# Patient Record
Sex: FEMALE | Race: WHITE | NOT HISPANIC OR LATINO | URBAN - METROPOLITAN AREA
[De-identification: names, ages, dates, MRNs, and addresses within clinical notes are randomized per-mention and may not be internally consistent; named-entity substitution may affect disease eponyms.]

---

## 2023-02-22 LAB
ANION GAP IN SER/PLAS: 9 MMOL/L (ref 10–20)
CALCIUM (MG/DL) IN SER/PLAS: 9.5 MG/DL (ref 8.6–10.3)
CARBON DIOXIDE, TOTAL (MMOL/L) IN SER/PLAS: 30 MMOL/L (ref 21–32)
CHLORIDE (MMOL/L) IN SER/PLAS: 105 MMOL/L (ref 98–107)
CREATININE (MG/DL) IN SER/PLAS: 0.58 MG/DL (ref 0.5–1.05)
ERYTHROCYTE DISTRIBUTION WIDTH (RATIO) BY AUTOMATED COUNT: 12.7 % (ref 11.5–14.5)
ERYTHROCYTE MEAN CORPUSCULAR HEMOGLOBIN CONCENTRATION (G/DL) BY AUTOMATED: 31.6 G/DL (ref 32–36)
ERYTHROCYTE MEAN CORPUSCULAR VOLUME (FL) BY AUTOMATED COUNT: 92 FL (ref 80–100)
ERYTHROCYTES (10*6/UL) IN BLOOD BY AUTOMATED COUNT: 4.32 X10E12/L (ref 4–5.2)
GFR FEMALE: >90 ML/MIN/1.73M2
GLUCOSE (MG/DL) IN SER/PLAS: 84 MG/DL (ref 74–99)
HEMATOCRIT (%) IN BLOOD BY AUTOMATED COUNT: 39.6 % (ref 36–46)
HEMOGLOBIN (G/DL) IN BLOOD: 12.5 G/DL (ref 12–16)
LEUKOCYTES (10*3/UL) IN BLOOD BY AUTOMATED COUNT: 4.1 X10E9/L (ref 4.4–11.3)
NRBC (PER 100 WBCS) BY AUTOMATED COUNT: 0 /100 WBC (ref 0–0)
PLATELETS (10*3/UL) IN BLOOD AUTOMATED COUNT: 341 X10E9/L (ref 150–450)
POTASSIUM (MMOL/L) IN SER/PLAS: 4.4 MMOL/L (ref 3.5–5.3)
SODIUM (MMOL/L) IN SER/PLAS: 140 MMOL/L (ref 136–145)
UREA NITROGEN (MG/DL) IN SER/PLAS: 24 MG/DL (ref 6–23)

## 2023-02-23 LAB
APPEARANCE, URINE: CLEAR
BILIRUBIN, URINE: NEGATIVE
BLOOD, URINE: NEGATIVE
COLOR, URINE: NORMAL
GLUCOSE, URINE: NEGATIVE MG/DL
KETONES, URINE: NEGATIVE MG/DL
LEUKOCYTE ESTERASE, URINE: NEGATIVE
NITRITE, URINE: NEGATIVE
PH, URINE: 7 (ref 5–8)
PROTEIN, URINE: NEGATIVE MG/DL
SPECIFIC GRAVITY, URINE: 1.01 (ref 1–1.03)
UROBILINOGEN, URINE: <2 MG/DL (ref 0–1.9)

## 2023-02-24 LAB — URINE CULTURE: NO GROWTH

## 2024-05-22 ENCOUNTER — NURSING HOME VISIT (OUTPATIENT)
Dept: POST ACUTE CARE | Facility: EXTERNAL LOCATION | Age: 69
End: 2024-05-22
Payer: COMMERCIAL

## 2024-05-22 DIAGNOSIS — I61.8 OTHER NONTRAUMATIC INTRACEREBRAL HEMORRHAGE, UNSPECIFIED LATERALITY (MULTI): ICD-10-CM

## 2024-05-22 DIAGNOSIS — G81.11 SPASTIC HEMIPLEGIA OF RIGHT DOMINANT SIDE DUE TO NONCEREBROVASCULAR ETIOLOGY (MULTI): ICD-10-CM

## 2024-05-22 DIAGNOSIS — F20.9 SCHIZOPHRENIA, UNSPECIFIED TYPE (MULTI): ICD-10-CM

## 2024-05-22 DIAGNOSIS — I10 PRIMARY HYPERTENSION: Primary | ICD-10-CM

## 2024-05-22 PROCEDURE — 99305 1ST NF CARE MODERATE MDM 35: CPT | Performed by: STUDENT IN AN ORGANIZED HEALTH CARE EDUCATION/TRAINING PROGRAM

## 2024-05-25 PROBLEM — G81.11: Status: ACTIVE | Noted: 2024-05-25

## 2024-05-25 PROBLEM — I10 PRIMARY HYPERTENSION: Status: ACTIVE | Noted: 2024-05-25

## 2024-05-25 PROBLEM — F20.9 SCHIZOPHRENIA (MULTI): Status: ACTIVE | Noted: 2024-05-25

## 2024-05-25 PROBLEM — I61.8: Status: ACTIVE | Noted: 2024-05-25

## 2024-05-25 ASSESSMENT — ENCOUNTER SYMPTOMS
MUSCULOSKELETAL NEGATIVE: 1
GASTROINTESTINAL NEGATIVE: 1
PSYCHIATRIC NEGATIVE: 1
CARDIOVASCULAR NEGATIVE: 1
RESPIRATORY NEGATIVE: 1
CONSTITUTIONAL NEGATIVE: 1
NEUROLOGICAL NEGATIVE: 1

## 2024-05-25 NOTE — PROGRESS NOTES
Subjective   Patient ID: Cristel Boyd is a 68 y.o. female.    Patient is a 68-year-old female with past medical history of ICH with subsequent hemiplegia, dementia, schizophrenia, bipolar, HTN, HLD, and muscle weakness who recently was admitted to the facility for long-term care.  Was recently admitted for skilled therapy however was subsequently cut by insurance as patient was overall optimized.  She states that overall and is tolerating her medications.  Neurological status has subsequently been stable.  And she states has had no recent falls or issues.  No additional issues at this time.        Review of Systems   Constitutional: Negative.    HENT: Negative.     Respiratory: Negative.     Cardiovascular: Negative.    Gastrointestinal: Negative.    Musculoskeletal: Negative.    Neurological: Negative.    Psychiatric/Behavioral: Negative.         Objective Vitals Reviewed via facility EMR   Physical Exam  Constitutional:       General: She is not in acute distress.     Appearance: She is not ill-appearing.   Eyes:      Pupils: Pupils are equal, round, and reactive to light.   Cardiovascular:      Rate and Rhythm: Normal rate and regular rhythm.      Pulses: Normal pulses.      Heart sounds: No murmur heard.  Pulmonary:      Effort: No respiratory distress.      Breath sounds: No wheezing.   Abdominal:      General: Abdomen is flat. Bowel sounds are normal. There is no distension.   Musculoskeletal:      Right lower leg: No edema.      Left lower leg: No edema.   Skin:     General: Skin is warm and dry.   Neurological:      Mental Status: She is alert. Mental status is at baseline.      Cranial Nerves: No cranial nerve deficit.      Motor: No weakness.   Psychiatric:         Mood and Affect: Mood normal.         Behavior: Behavior normal.         Assessment/Plan   Diagnoses and all orders for this visit:  Primary hypertension  Schizophrenia, unspecified type (Multi)  Spastic hemiplegia of right dominant side due  to noncerebrovascular etiology (Multi)  Other nontraumatic intracerebral hemorrhage, unspecified laterality (Multi)        Patient seen and examined at bedside.  Overall medically stable.  Recent laboratory values reviewed.  Medications reviewed and reconciled.  Appreciate psych recommendations with regards to antipsychotics.  Otherwise, continue supportive care.  No changes to medications at this time.  Staff updated with care plan and were agreeable, they also voiced no issues or concerns currently.    Reviewed and approved by AUSTIN MURRAY on 5/25/24 at 1:22 PM.

## 2024-06-30 ENCOUNTER — APPOINTMENT (OUTPATIENT)
Dept: CARDIOLOGY | Facility: HOSPITAL | Age: 69
End: 2024-06-30
Payer: COMMERCIAL

## 2024-06-30 ENCOUNTER — APPOINTMENT (OUTPATIENT)
Dept: RADIOLOGY | Facility: HOSPITAL | Age: 69
End: 2024-06-30
Payer: COMMERCIAL

## 2024-06-30 ENCOUNTER — HOSPITAL ENCOUNTER (EMERGENCY)
Facility: HOSPITAL | Age: 69
Discharge: HOME | End: 2024-06-30
Attending: EMERGENCY MEDICINE
Payer: COMMERCIAL

## 2024-06-30 VITALS
RESPIRATION RATE: 20 BRPM | TEMPERATURE: 98.1 F | HEIGHT: 65 IN | BODY MASS INDEX: 21.67 KG/M2 | SYSTOLIC BLOOD PRESSURE: 162 MMHG | DIASTOLIC BLOOD PRESSURE: 98 MMHG | WEIGHT: 130.07 LBS | HEART RATE: 84 BPM | OXYGEN SATURATION: 96 %
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
## 2024-06-30 LAB
ALBUMIN SERPL BCP-MCNC: 3.9 G/DL (ref 3.4–5)
ALP SERPL-CCNC: 61 U/L (ref 33–136)
ALT SERPL W P-5'-P-CCNC: 20 U/L (ref 7–45)
ANION GAP SERPL CALC-SCNC: 11 MMOL/L (ref 10–20)
AST SERPL W P-5'-P-CCNC: 28 U/L (ref 9–39)
BASOPHILS # BLD AUTO: 0.03 X10*3/UL (ref 0–0.1)
BASOPHILS NFR BLD AUTO: 0.7 %
BILIRUB SERPL-MCNC: 0.3 MG/DL (ref 0–1.2)
BUN SERPL-MCNC: 21 MG/DL (ref 6–23)
CALCIUM SERPL-MCNC: 9.5 MG/DL (ref 8.6–10.3)
CARDIAC TROPONIN I PNL SERPL HS: 4 NG/L (ref 0–13)
CHLORIDE SERPL-SCNC: 110 MMOL/L (ref 98–107)
CO2 SERPL-SCNC: 23 MMOL/L (ref 21–32)
CREAT SERPL-MCNC: 0.61 MG/DL (ref 0.5–1.05)
EGFRCR SERPLBLD CKD-EPI 2021: >90 ML/MIN/1.73M*2
EOSINOPHIL # BLD AUTO: 0.19 X10*3/UL (ref 0–0.7)
EOSINOPHIL NFR BLD AUTO: 4.5 %
ERYTHROCYTE [DISTWIDTH] IN BLOOD BY AUTOMATED COUNT: 12.1 % (ref 11.5–14.5)
GLUCOSE SERPL-MCNC: 104 MG/DL (ref 74–99)
HCT VFR BLD AUTO: 38.6 % (ref 36–46)
HGB BLD-MCNC: 12.7 G/DL (ref 12–16)
IMM GRANULOCYTES # BLD AUTO: 0.02 X10*3/UL (ref 0–0.7)
IMM GRANULOCYTES NFR BLD AUTO: 0.5 % (ref 0–0.9)
LYMPHOCYTES # BLD AUTO: 1.11 X10*3/UL (ref 1.2–4.8)
LYMPHOCYTES NFR BLD AUTO: 26.1 %
MAGNESIUM SERPL-MCNC: 1.48 MG/DL (ref 1.6–2.4)
MCH RBC QN AUTO: 31 PG (ref 26–34)
MCHC RBC AUTO-ENTMCNC: 32.9 G/DL (ref 32–36)
MCV RBC AUTO: 94 FL (ref 80–100)
MONOCYTES # BLD AUTO: 0.38 X10*3/UL (ref 0.1–1)
MONOCYTES NFR BLD AUTO: 8.9 %
NEUTROPHILS # BLD AUTO: 2.52 X10*3/UL (ref 1.2–7.7)
NEUTROPHILS NFR BLD AUTO: 59.3 %
NRBC BLD-RTO: 0 /100 WBCS (ref 0–0)
PLATELET # BLD AUTO: 183 X10*3/UL (ref 150–450)
POTASSIUM SERPL-SCNC: 4.5 MMOL/L (ref 3.5–5.3)
PROT SERPL-MCNC: 7.1 G/DL (ref 6.4–8.2)
RBC # BLD AUTO: 4.1 X10*6/UL (ref 4–5.2)
RBC MORPH BLD: NORMAL
SODIUM SERPL-SCNC: 139 MMOL/L (ref 136–145)
WBC # BLD AUTO: 4.3 X10*3/UL (ref 4.4–11.3)

## 2024-06-30 PROCEDURE — 80053 COMPREHEN METABOLIC PANEL: CPT

## 2024-06-30 PROCEDURE — 99285 EMERGENCY DEPT VISIT HI MDM: CPT | Mod: 25

## 2024-06-30 PROCEDURE — 2500000005 HC RX 250 GENERAL PHARMACY W/O HCPCS

## 2024-06-30 PROCEDURE — 72125 CT NECK SPINE W/O DYE: CPT

## 2024-06-30 PROCEDURE — 2500000001 HC RX 250 WO HCPCS SELF ADMINISTERED DRUGS (ALT 637 FOR MEDICARE OP)

## 2024-06-30 PROCEDURE — 71045 X-RAY EXAM CHEST 1 VIEW: CPT

## 2024-06-30 PROCEDURE — 84484 ASSAY OF TROPONIN QUANT: CPT

## 2024-06-30 PROCEDURE — 93005 ELECTROCARDIOGRAM TRACING: CPT

## 2024-06-30 PROCEDURE — 83735 ASSAY OF MAGNESIUM: CPT

## 2024-06-30 PROCEDURE — 36415 COLL VENOUS BLD VENIPUNCTURE: CPT

## 2024-06-30 PROCEDURE — 85025 COMPLETE CBC W/AUTO DIFF WBC: CPT

## 2024-06-30 PROCEDURE — 70450 CT HEAD/BRAIN W/O DYE: CPT | Performed by: RADIOLOGY

## 2024-06-30 PROCEDURE — 2500000004 HC RX 250 GENERAL PHARMACY W/ HCPCS (ALT 636 FOR OP/ED)

## 2024-06-30 PROCEDURE — 71045 X-RAY EXAM CHEST 1 VIEW: CPT | Performed by: RADIOLOGY

## 2024-06-30 PROCEDURE — 72125 CT NECK SPINE W/O DYE: CPT | Performed by: RADIOLOGY

## 2024-06-30 PROCEDURE — 70450 CT HEAD/BRAIN W/O DYE: CPT

## 2024-06-30 RX ORDER — LIDOCAINE 560 MG/1
1 PATCH PERCUTANEOUS; TOPICAL; TRANSDERMAL DAILY
Status: DISCONTINUED | OUTPATIENT
Start: 2024-06-30 | End: 2024-06-30 | Stop reason: HOSPADM

## 2024-06-30 RX ORDER — ACETAMINOPHEN 325 MG/1
975 TABLET ORAL ONCE
Status: COMPLETED | OUTPATIENT
Start: 2024-06-30 | End: 2024-06-30

## 2024-06-30 RX ORDER — ARIPIPRAZOLE 10 MG/1
10 TABLET, ORALLY DISINTEGRATING ORAL DAILY
Status: DISCONTINUED | OUTPATIENT
Start: 2024-06-30 | End: 2024-06-30

## 2024-06-30 RX ORDER — ARIPIPRAZOLE 10 MG/1
10 TABLET ORAL DAILY
Status: DISCONTINUED | OUTPATIENT
Start: 2024-06-30 | End: 2024-06-30 | Stop reason: HOSPADM

## 2024-06-30 RX ORDER — LANOLIN ALCOHOL/MO/W.PET/CERES
400 CREAM (GRAM) TOPICAL DAILY
Status: DISCONTINUED | OUTPATIENT
Start: 2024-06-30 | End: 2024-06-30 | Stop reason: HOSPADM

## 2024-06-30 ASSESSMENT — LIFESTYLE VARIABLES
HAVE YOU EVER FELT YOU SHOULD CUT DOWN ON YOUR DRINKING: NO
EVER HAD A DRINK FIRST THING IN THE MORNING TO STEADY YOUR NERVES TO GET RID OF A HANGOVER: NO
TOTAL SCORE: 0
EVER FELT BAD OR GUILTY ABOUT YOUR DRINKING: NO
HAVE PEOPLE ANNOYED YOU BY CRITICIZING YOUR DRINKING: NO

## 2024-06-30 ASSESSMENT — PAIN - FUNCTIONAL ASSESSMENT
PAIN_FUNCTIONAL_ASSESSMENT: PAINAD (PAIN ASSESSMENT IN ADVANCED DEMENTIA SCALE)
PAIN_FUNCTIONAL_ASSESSMENT: 0-10

## 2024-06-30 ASSESSMENT — PAIN DESCRIPTION - LOCATION
LOCATION: SHOULDER

## 2024-06-30 ASSESSMENT — PAIN SCALES - WONG BAKER: WONGBAKER_NUMERICALRESPONSE: HURTS WHOLE LOT

## 2024-06-30 ASSESSMENT — PAIN SCALES - PAIN ASSESSMENT IN ADVANCED DEMENTIA (PAINAD)
CONSOLABILITY: NO NEED TO CONSOLE
BREATHING: NORMAL
BODYLANGUAGE: RELAXED

## 2024-06-30 ASSESSMENT — PAIN DESCRIPTION - DESCRIPTORS: DESCRIPTORS: SHARP

## 2024-06-30 ASSESSMENT — PAIN DESCRIPTION - FREQUENCY: FREQUENCY: CONSTANT/CONTINUOUS

## 2024-06-30 ASSESSMENT — PAIN DESCRIPTION - ORIENTATION
ORIENTATION: RIGHT

## 2024-06-30 ASSESSMENT — PAIN DESCRIPTION - PAIN TYPE
TYPE: CHRONIC PAIN
TYPE: CHRONIC PAIN

## 2024-06-30 ASSESSMENT — PAIN DESCRIPTION - PROGRESSION: CLINICAL_PROGRESSION: NOT CHANGED

## 2024-06-30 ASSESSMENT — PAIN SCALES - GENERAL: PAINLEVEL_OUTOF10: 7

## 2024-06-30 NOTE — DISCHARGE INSTRUCTIONS
No injuries found on workup today.  No acute intracranial bleed or other injuries noted on CT of the head.  Labs are otherwise unremarkable.    Seek immediate medical attention if you develop: worsening headache, nausea, vomiting, confusion, weakness, loss of motion in your arms or legs, loss of control of your urine or stool, difficulty waking from sleep, neck pain, fever, or any new or worsening symptoms.

## 2024-06-30 NOTE — ED PROVIDER NOTES
EMERGENCY DEPARTMENT ENCOUNTER      Pt Name: Cristel Boyd  MRN: [id number]  Birthdate 1955  Date of evaluation: 6/30/2024  Provider: Sandeep Wood DO    CHIEF COMPLAINT       Chief Complaint   Patient presents with    Fall     Unwitnessed fall around 0830, staff heard fall. No obvious injuries.         HISTORY OF PRESENT ILLNESS    Patient is a 68-year-old female coming from [facility] after she had a fall this morning.  This was unwitnessed.  This happened around 2 hours prior to arrival, patient was advised to come in for further evaluation by the patient's per physician.  Patient is a history of bipolar disorder, hypertension, hemiplegia and hemiparesis following a nontraumatic intracerebral hemorrhage affecting the right side, hyperlipidemia.  Patient is otherwise unable to provide any significant history.      History provided by:  EMS personnel   used: No        Nursing Notes were reviewed.    PAST MEDICAL HISTORY   No past medical history on file.      SURGICAL HISTORY     No past surgical history on file.      CURRENT MEDICATIONS       There are no discharge medications for this patient.      ALLERGIES     Patient has no known allergies.    FAMILY HISTORY     No family history on file.       SOCIAL HISTORY       Social History     Socioeconomic History    Marital status:      Spouse name: Not on file    Number of children: Not on file    Years of education: Not on file    Highest education level: Not on file   Occupational History    Not on file   Tobacco Use    Smoking status: Not on file    Smokeless tobacco: Not on file   Substance and Sexual Activity    Alcohol use: Not on file    Drug use: Not on file    Sexual activity: Not on file   Other Topics Concern    Not on file   Social History Narrative    Not on file     Social Determinants of Health     Financial Resource Strain: Not on file   Food Insecurity: Not on file   Transportation Needs:  Not on file   Physical Activity: Not on file   Stress: Not on file   Social Connections: Not on file   Intimate Partner Violence: Not on file   Housing Stability: Not on file       SCREENINGS                        PHYSICAL EXAM    (up to 7 for level 4, 8 or more for level 5)     ED Triage Vitals   Temp Pulse Resp BP   -- -- -- --      SpO2 Temp src Heart Rate Source Patient Position   -- -- -- --      BP Location FiO2 (%)     -- --       Physical Exam  Vitals and nursing note reviewed.   Constitutional:       Appearance: She is not toxic-appearing.   HENT:      Head: Normocephalic and atraumatic.      Right Ear: External ear normal.      Left Ear: External ear normal.      Nose: Nose normal.   Eyes:      General:         Right eye: No discharge.         Left eye: No discharge.      Extraocular Movements: Extraocular movements intact.      Conjunctiva/sclera: Conjunctivae normal.      Pupils: Pupils are equal, round, and reactive to light.   Cardiovascular:      Rate and Rhythm: Normal rate and regular rhythm.      Pulses: Normal pulses.      Heart sounds: Normal heart sounds.   Pulmonary:      Effort: Pulmonary effort is normal.      Breath sounds: Normal breath sounds.   Abdominal:      General: There is no distension.      Palpations: Abdomen is soft. There is no mass.      Tenderness: There is no abdominal tenderness. There is no guarding.   Musculoskeletal:         General: No deformity or signs of injury.      Comments: No obvious injuries on exam.   Skin:     General: Skin is warm and dry.   Neurological:      General: No focal deficit present.      Mental Status: She is oriented to person, place, and time. Mental status is at baseline.      GCS: GCS eye subscore is 3. GCS verbal subscore is 5. GCS motor subscore is 6.   Psychiatric:         Mood and Affect: Mood normal.         Behavior: Behavior normal.          DIAGNOSTIC RESULTS     LABS:  Labs Reviewed   CBC WITH AUTO DIFFERENTIAL - Abnormal       Result  Value    WBC 4.3 (*)     nRBC 0.0      RBC 4.10      Hemoglobin 12.7      Hematocrit 38.6      MCV 94      MCH 31.0      MCHC 32.9      RDW 12.1      Platelets 183      Neutrophils % 59.3      Immature Granulocytes %, Automated 0.5      Lymphocytes % 26.1      Monocytes % 8.9      Eosinophils % 4.5      Basophils % 0.7      Neutrophils Absolute 2.52      Immature Granulocytes Absolute, Automated 0.02      Lymphocytes Absolute 1.11 (*)     Monocytes Absolute 0.38      Eosinophils Absolute 0.19      Basophils Absolute 0.03     COMPREHENSIVE METABOLIC PANEL - Abnormal    Glucose 104 (*)     Sodium 139      Potassium 4.5      Chloride 110 (*)     Bicarbonate 23      Anion Gap 11      Urea Nitrogen 21      Creatinine 0.61      eGFR >90      Calcium 9.5      Albumin 3.9      Alkaline Phosphatase 61      Total Protein 7.1      AST 28      Bilirubin, Total 0.3      ALT 20     MAGNESIUM - Abnormal    Magnesium 1.48 (*)    SERIAL TROPONIN-INITIAL - Normal    Troponin I, High Sensitivity 4      Narrative:     Less than 99th percentile of normal range cutoff-  Female and children under 18 years old <14 ng/L; Male <21 ng/L: Negative  Repeat testing should be performed if clinically indicated.     Female and children under 18 years old 14-50 ng/L; Male 21-50 ng/L:  Consistent with possible cardiac damage and possible increased clinical   risk. Serial measurements may help to assess extent of myocardial damage.     >50 ng/L: Consistent with cardiac damage, increased clinical risk and  myocardial infarction. Serial measurements may help assess extent of   myocardial damage.      NOTE: Children less than 1 year old may have higher baseline troponin   levels and results should be interpreted in conjunction with the overall   clinical context.     NOTE: Troponin I testing is performed using a different   testing methodology at [facility] than at other   [facility] hospitals. Direct result comparisons should only   be made  within the same method.   TROPONIN SERIES- (INITIAL, 1 HR)    Narrative:     The following orders were created for panel order Troponin I Series, High Sensitivity (0, 1 HR).  Procedure                               Abnormality         Status                     ---------                               -----------         ------                     Troponin I, High Sensiti...[[id number]]  Normal              Final result               Troponin, High Sensitivi...[[id number]]                                                   Please view results for these tests on the individual orders.   SERIAL TROPONIN, 1 HOUR   MORPHOLOGY    RBC Morphology No significant RBC morphology present         All other labs were within normal range or not returned as of this dictation.    Imaging  CT head wo IV contrast   Final Result   Mild age-related changes. No acute intracranial process.        Mild peripheral mucosal thickening of the paranasal sinuses.        Partial opacification left mastoid air cells posteriorly compatible   with age-indeterminate mild mastoiditis.        MACRO:   None.        Signed by: Krunal Angela 6/30/2024 12:09 PM   Dictation workstation:   [id number]      CT cervical spine wo IV contrast   Final Result   Prominent multilevel degenerative disc and facet changes throughout   the entire cervical spine as well as the upper thoracic spine.        Postoperative changes with metallic posterior elements struts from C3   through C7.        Mild anterolisthesis of C7 relative to T1 which is likely   chronic/degenerative.        No acute fracture or compression deformity.        MACRO:   None.        Signed by: Krunal Angela 6/30/2024 12:13 PM   Dictation workstation:   [id number]      XR chest 1 view   Final Result   No focal infiltrate or pneumothorax is identified.        MACRO:   None.        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  Patient had an unwitnessed fall around 2 hours ago.  Otherwise history is unable to be obtained from the patient.  Patient has GCS 14, losing a point for eyes but she does open eyes to voice.  Otherwise there are no significant injuries noted on exam.  Head is atraumatic.  Will obtain CT head and C-spine to evaluate for acute injuries.  Also obtain cardiac workup given that the patient's fall was unwitnessed.  If workup is unremarkable anticipate discharge back to her nursing facility.        Please see ED course for additional MDM.    ED Course as of 07/01/24 2339   Sun Jun 30, 2024   1215 CT of the head demonstrates no acute intracranial process.  Other chronic findings noted.  CT of the C-spine negative for any acute fractures or compression deformities. [CL]   1215 Chest x-ray unremarkable. [CL]   1258 Home Abilify dose ordered. [CL]   1300 Troponin normal. [CL]   1300 Magnesium replacement was ordered for mildly low magnesium level 1.48. [CL]   1313 CBC is at patient's baseline.  Lab work is otherwise reassuring.  Patient be discharged back to her nursing facility. [CL]      ED Course User Index  [CL] Delonte Levy,          Diagnoses as of 07/01/24 2339   Fall, initial encounter        Patient and or family in agreement and understanding of treatment plan.  All questions answered.      I reviewed the case with the attending ED physician. The attending ED physician agrees with the plan. Patient and/or patient´s representative was counseled regarding labs, imaging, likely diagnosis, and plan. All questions were answered.    ED Medications administered this visit:    Medications   acetaminophen (Tylenol) tablet 975 mg (975 mg oral Given 6/30/24 1342)       New Prescriptions from this visit:    There are no discharge  medications for this patient.      Follow-up:  Parmjit Melgar, DO  [address] [facility] [address]  [place] [id number] [phone]    Schedule an appointment as soon as possible for a visit           Final Impression:   1. Fall, initial encounter          (Please note that portions of this note were completed with a voice recognition program.  Efforts were made to edit the dictations but occasionally words are mis-transcribed.)     Delonte Levy DO  Resident  06/30/24 1314      The patient was seen by the resident/fellow.  I have personally performed a substantive portion of the encounter.  I have seen and examined the patient; agree with the workup, evaluation, MDM, management and diagnosis.  The care plan has been discussed with the resident; I have reviewed the resident’s note and agree with the documented findings.       Sandeep Wood DO  07/01/24 [id number]

## 2024-07-01 LAB
ATRIAL RATE: 51 BPM
P AXIS: 77 DEGREES
P OFFSET: 171 MS
P ONSET: 120 MS
PR INTERVAL: 168 MS
Q ONSET: 204 MS
QRS COUNT: 8 BEATS
QRS DURATION: 134 MS
QT INTERVAL: 430 MS
QTC CALCULATION(BAZETT): 396 MS
QTC FREDERICIA: 407 MS
R AXIS: -70 DEGREES
T AXIS: 68 DEGREES
T OFFSET: 419 MS
VENTRICULAR RATE: 51 BPM

## 2024-07-10 ENCOUNTER — NURSING HOME VISIT (OUTPATIENT)
Dept: POST ACUTE CARE | Facility: EXTERNAL LOCATION | Age: 69
End: 2024-07-10
Payer: COMMERCIAL

## 2024-07-10 DIAGNOSIS — F20.9 SCHIZOPHRENIA, UNSPECIFIED TYPE (MULTI): ICD-10-CM

## 2024-07-10 DIAGNOSIS — I95.89 HYPOTENSION DUE TO HYPOVOLEMIA: Primary | ICD-10-CM

## 2024-07-10 DIAGNOSIS — G81.11 SPASTIC HEMIPLEGIA OF RIGHT DOMINANT SIDE DUE TO NONCEREBROVASCULAR ETIOLOGY (MULTI): ICD-10-CM

## 2024-07-10 DIAGNOSIS — I61.8 OTHER NONTRAUMATIC INTRACEREBRAL HEMORRHAGE, UNSPECIFIED LATERALITY (MULTI): ICD-10-CM

## 2024-07-10 DIAGNOSIS — E86.1 HYPOTENSION DUE TO HYPOVOLEMIA: Primary | ICD-10-CM

## 2024-07-13 PROBLEM — E86.1 HYPOTENSION DUE TO HYPOVOLEMIA: Status: ACTIVE | Noted: 2024-07-13

## 2024-07-13 PROBLEM — I95.89 HYPOTENSION DUE TO HYPOVOLEMIA: Status: ACTIVE | Noted: 2024-07-13

## 2024-07-13 ASSESSMENT — ENCOUNTER SYMPTOMS
CARDIOVASCULAR NEGATIVE: 1
PSYCHIATRIC NEGATIVE: 1
FATIGUE: 1
WEAKNESS: 1
ARTHRALGIAS: 1
RESPIRATORY NEGATIVE: 1
GASTROINTESTINAL NEGATIVE: 1

## 2024-07-14 NOTE — PROGRESS NOTES
Subjective   Patient ID: Cristel Boyd is a 68 y.o. female.    Patient seen and examined on routine evaluation.  On evaluation, patient is a poor historian and is unable to provide much history.  However, she endorses that she feels slightly weaker than usual.  In addition, review of vitals shows that patient was somewhat hypotensive.  She does not endorse any constitutional symptoms, however does feel lightheaded.  Endorses no changes in medications and states no issues or difficulty.  Otherwise, regards no additional issues or difficulties.        Review of Systems   Constitutional:  Positive for fatigue.   HENT: Negative.     Respiratory: Negative.     Cardiovascular: Negative.    Gastrointestinal: Negative.    Musculoskeletal:  Positive for arthralgias.   Neurological:  Positive for weakness.   Psychiatric/Behavioral: Negative.         Objective Vitals Reviewed via facility EMR   Physical Exam  Constitutional:       General: She is not in acute distress.     Appearance: She is ill-appearing.      Comments: pale   Eyes:      Pupils: Pupils are equal, round, and reactive to light.   Cardiovascular:      Rate and Rhythm: Normal rate and regular rhythm.      Pulses: Normal pulses.      Heart sounds: No murmur heard.  Pulmonary:      Effort: No respiratory distress.      Breath sounds: No wheezing.   Abdominal:      General: Abdomen is flat. Bowel sounds are normal. There is no distension.   Musculoskeletal:      Right lower leg: No edema.      Left lower leg: No edema.   Skin:     General: Skin is warm and dry.   Neurological:      Mental Status: She is alert. Mental status is at baseline.      Cranial Nerves: No cranial nerve deficit.      Motor: Weakness present.   Psychiatric:         Mood and Affect: Mood normal.         Behavior: Behavior normal.         Assessment/Plan   Diagnoses and all orders for this visit:  Hypotension due to hypovolemia  Schizophrenia, unspecified type (Multi)  Spastic hemiplegia of  right dominant side due to noncerebrovascular etiology (Multi)  Other nontraumatic intracerebral hemorrhage, unspecified laterality (Multi)      Patient seen and examined.  Overall looks unwell on evaluation.  Unable to provide much history.  Noted to be significantly hypotensive suspect that this is secondary to her blood pressure medications however concern for metabolic or infectious etiology at this time.  Due to this, will obtain stat labs CBC CMP as well as magnesium and.  We will also discontinue antihypertensives until workup is complete.  Advised staff to send patient to the hospital if blood pressure does not improve or she is more is more symptomatic.  They are agreeable to plan.  Discussed with ANGELO in order to facilitate close follow-up.  Otherwise, no additional issues at this time.    Reviewed and approved by AUSTIN MURRAY on 7/13/24 at 10:43 PM.

## 2024-07-24 ENCOUNTER — NURSING HOME VISIT (OUTPATIENT)
Dept: POST ACUTE CARE | Facility: EXTERNAL LOCATION | Age: 69
End: 2024-07-24
Payer: COMMERCIAL

## 2024-07-24 DIAGNOSIS — M25.511 ACUTE PAIN OF RIGHT SHOULDER: ICD-10-CM

## 2024-07-24 DIAGNOSIS — G81.11 SPASTIC HEMIPLEGIA OF RIGHT DOMINANT SIDE DUE TO NONCEREBROVASCULAR ETIOLOGY (MULTI): ICD-10-CM

## 2024-07-24 DIAGNOSIS — F20.9 SCHIZOPHRENIA, UNSPECIFIED TYPE (MULTI): ICD-10-CM

## 2024-07-24 DIAGNOSIS — M25.531 RIGHT WRIST PAIN: ICD-10-CM

## 2024-07-24 DIAGNOSIS — I10 PRIMARY HYPERTENSION: Primary | ICD-10-CM

## 2024-07-24 PROCEDURE — 99309 SBSQ NF CARE MODERATE MDM 30: CPT | Performed by: STUDENT IN AN ORGANIZED HEALTH CARE EDUCATION/TRAINING PROGRAM

## 2024-07-24 NOTE — LETTER
Patient: Cristel Boyd  : 1955    Encounter Date: 2024    Subjective  Patient ID: Cristel Boyd is a 68 y.o. female.    Patient seen and examined at bedside.  States that she is overall doing well however having persistent issues with shoulder pain.  This recently started over the past couple days.  Was given a lidocaine patch however states that it is not helping and she does have difficulty with range of motion.  No recent trauma noted, no falls noted.  Otherwise, states no additional issues or concerns and overall feels well.        Review of Systems   Constitutional: Negative.    HENT: Negative.     Respiratory: Negative.     Cardiovascular: Negative.    Gastrointestinal: Negative.    Musculoskeletal:  Positive for arthralgias and joint swelling.        Pain in R shoulder and wrist   Psychiatric/Behavioral: Negative.         ObjectiveVitals Reviewed via facility EMR   Physical Exam  Constitutional:       General: She is not in acute distress.     Appearance: She is not ill-appearing.   Eyes:      Pupils: Pupils are equal, round, and reactive to light.   Cardiovascular:      Rate and Rhythm: Normal rate and regular rhythm.      Pulses: Normal pulses.      Heart sounds: No murmur heard.  Pulmonary:      Effort: No respiratory distress.      Breath sounds: No wheezing.   Abdominal:      General: Abdomen is flat. Bowel sounds are normal. There is no distension.   Musculoskeletal:      Right lower leg: No edema.      Left lower leg: No edema.      Comments: Pain in R shoulder, dec rom, pain in wrist on palptiation   Skin:     General: Skin is warm and dry.   Neurological:      Mental Status: She is alert. Mental status is at baseline.      Cranial Nerves: No cranial nerve deficit.      Motor: No weakness.   Psychiatric:         Mood and Affect: Mood normal.         Behavior: Behavior normal.         Assessment/Plan  Diagnoses and all orders for this visit:  Primary hypertension  Schizophrenia,  unspecified type (Multi)  Spastic hemiplegia of right dominant side due to noncerebrovascular etiology (Multi)  Acute pain of right shoulder  Right wrist pain  Patient seen and examined.  Does have significant issues with range of motion of her right breast as well as right shoulder.  Concern for underlying musculoskeletal issue, recommend x-rays of her shoulder as well as wrist today.  Will closely follow-up on results.  Continue supportive care.    Reviewed and approved by PARMJIT MURRAY on 7/25/24 at 10:43 PM.         Electronically Signed By: Parmjit Murray DO   7/25/24 10:43 PM

## 2024-07-25 PROBLEM — M25.531 RIGHT WRIST PAIN: Status: ACTIVE | Noted: 2024-07-25

## 2024-07-25 PROBLEM — M25.511 ACUTE PAIN OF RIGHT SHOULDER: Status: ACTIVE | Noted: 2024-07-25

## 2024-07-25 ASSESSMENT — ENCOUNTER SYMPTOMS
GASTROINTESTINAL NEGATIVE: 1
CONSTITUTIONAL NEGATIVE: 1
CARDIOVASCULAR NEGATIVE: 1
ARTHRALGIAS: 1
PSYCHIATRIC NEGATIVE: 1
RESPIRATORY NEGATIVE: 1
JOINT SWELLING: 1

## 2024-07-26 NOTE — PROGRESS NOTES
Subjective   Patient ID: Cristel Boyd is a 68 y.o. female.    Patient seen and examined at bedside.  States that she is overall doing well however having persistent issues with shoulder pain.  This recently started over the past couple days.  Was given a lidocaine patch however states that it is not helping and she does have difficulty with range of motion.  No recent trauma noted, no falls noted.  Otherwise, states no additional issues or concerns and overall feels well.        Review of Systems   Constitutional: Negative.    HENT: Negative.     Respiratory: Negative.     Cardiovascular: Negative.    Gastrointestinal: Negative.    Musculoskeletal:  Positive for arthralgias and joint swelling.        Pain in R shoulder and wrist   Psychiatric/Behavioral: Negative.         Objective Vitals Reviewed via facility EMR   Physical Exam  Constitutional:       General: She is not in acute distress.     Appearance: She is not ill-appearing.   Eyes:      Pupils: Pupils are equal, round, and reactive to light.   Cardiovascular:      Rate and Rhythm: Normal rate and regular rhythm.      Pulses: Normal pulses.      Heart sounds: No murmur heard.  Pulmonary:      Effort: No respiratory distress.      Breath sounds: No wheezing.   Abdominal:      General: Abdomen is flat. Bowel sounds are normal. There is no distension.   Musculoskeletal:      Right lower leg: No edema.      Left lower leg: No edema.      Comments: Pain in R shoulder, dec rom, pain in wrist on palptiation   Skin:     General: Skin is warm and dry.   Neurological:      Mental Status: She is alert. Mental status is at baseline.      Cranial Nerves: No cranial nerve deficit.      Motor: No weakness.   Psychiatric:         Mood and Affect: Mood normal.         Behavior: Behavior normal.         Assessment/Plan   Diagnoses and all orders for this visit:  Primary hypertension  Schizophrenia, unspecified type (Multi)  Spastic hemiplegia of right dominant side due to  noncerebrovascular etiology (Multi)  Acute pain of right shoulder  Right wrist pain  Patient seen and examined.  Does have significant issues with range of motion of her right breast as well as right shoulder.  Concern for underlying musculoskeletal issue, recommend x-rays of her shoulder as well as wrist today.  Will closely follow-up on results.  Continue supportive care.    Reviewed and approved by AUSTIN MURRAY on 7/25/24 at 10:43 PM.

## 2024-10-02 ENCOUNTER — NURSING HOME VISIT (OUTPATIENT)
Dept: POST ACUTE CARE | Facility: EXTERNAL LOCATION | Age: 69
End: 2024-10-02
Payer: COMMERCIAL

## 2024-10-02 DIAGNOSIS — I10 PRIMARY HYPERTENSION: Primary | ICD-10-CM

## 2024-10-02 DIAGNOSIS — G81.11 SPASTIC HEMIPLEGIA OF RIGHT DOMINANT SIDE DUE TO NONCEREBROVASCULAR ETIOLOGY (MULTI): ICD-10-CM

## 2024-10-02 DIAGNOSIS — F20.89 OTHER SCHIZOPHRENIA: ICD-10-CM

## 2024-10-04 ASSESSMENT — ENCOUNTER SYMPTOMS
CARDIOVASCULAR NEGATIVE: 1
PSYCHIATRIC NEGATIVE: 1
WEAKNESS: 1
RESPIRATORY NEGATIVE: 1
FATIGUE: 1
MUSCULOSKELETAL NEGATIVE: 1
GASTROINTESTINAL NEGATIVE: 1

## 2024-10-05 NOTE — PROGRESS NOTES
Subjective   Patient ID: Cristel Boyd is a 68 y.o. female.    Patient seen and examined at bedside.  She regards that she is overall doing well without any concerns, would like to be discharged from the facility if possible.  Otherwise, endorses some generalized shoulder pain however overall feels stable.  Otherwise states no acute issues or concerns.  Regards that she overall feels well.        Review of Systems   Constitutional:  Positive for fatigue.   HENT: Negative.     Respiratory: Negative.     Cardiovascular: Negative.    Gastrointestinal: Negative.    Musculoskeletal: Negative.    Neurological:  Positive for weakness.   Psychiatric/Behavioral: Negative.         Objective Vitals Reviewed via facility EMR   Physical Exam  Constitutional:       General: She is not in acute distress.     Appearance: She is not ill-appearing.   Eyes:      Pupils: Pupils are equal, round, and reactive to light.   Cardiovascular:      Rate and Rhythm: Normal rate and regular rhythm.      Pulses: Normal pulses.      Heart sounds: No murmur heard.  Pulmonary:      Effort: No respiratory distress.      Breath sounds: No wheezing.   Abdominal:      General: Abdomen is flat. Bowel sounds are normal. There is no distension.   Musculoskeletal:      Right lower leg: No edema.      Left lower leg: No edema.   Skin:     General: Skin is warm and dry.   Neurological:      Mental Status: She is alert. Mental status is at baseline.      Cranial Nerves: No cranial nerve deficit.      Motor: Weakness present.   Psychiatric:         Mood and Affect: Mood normal.         Behavior: Behavior normal.         Assessment/Plan   Diagnoses and all orders for this visit:  Primary hypertension  Other schizophrenia  Spastic hemiplegia of right dominant side due to noncerebrovascular etiology (Multi)      Patient seen and examined at bedside.  Overall medically stable, continue supportive care.  No changes in medications at this time.  Encouraged fall  precautions.  If needed, recommend PT OT, do not recommend that the patient be discharged at this time, as I would be concerned about her overall wellbeing and medical care moving forward.  Otherwise no additional issues.    Reviewed and approved by AUSTIN MURRAY on 10/4/24 at 8:47 PM.

## 2024-11-06 ENCOUNTER — NURSING HOME VISIT (OUTPATIENT)
Dept: POST ACUTE CARE | Facility: EXTERNAL LOCATION | Age: 69
End: 2024-11-06
Payer: COMMERCIAL

## 2024-11-06 DIAGNOSIS — I61.8 OTHER NONTRAUMATIC INTRACEREBRAL HEMORRHAGE, UNSPECIFIED LATERALITY (MULTI): ICD-10-CM

## 2024-11-06 DIAGNOSIS — F20.89 OTHER SCHIZOPHRENIA: ICD-10-CM

## 2024-11-06 DIAGNOSIS — G81.11 SPASTIC HEMIPLEGIA OF RIGHT DOMINANT SIDE DUE TO NONCEREBROVASCULAR ETIOLOGY (MULTI): ICD-10-CM

## 2024-11-06 DIAGNOSIS — I10 PRIMARY HYPERTENSION: Primary | ICD-10-CM

## 2024-11-06 PROCEDURE — 99308 SBSQ NF CARE LOW MDM 20: CPT | Performed by: STUDENT IN AN ORGANIZED HEALTH CARE EDUCATION/TRAINING PROGRAM

## 2024-11-06 ASSESSMENT — ENCOUNTER SYMPTOMS
FATIGUE: 1
GASTROINTESTINAL NEGATIVE: 1
RESPIRATORY NEGATIVE: 1
PSYCHIATRIC NEGATIVE: 1
MUSCULOSKELETAL NEGATIVE: 1
CARDIOVASCULAR NEGATIVE: 1
WEAKNESS: 1

## 2024-11-07 NOTE — PROGRESS NOTES
Subjective   Patient ID: Cristel Boyd is a 68 y.o. female.    Patient seen on routine evaluation, she endorses no issues or concerns, and is tolerating her medications, staff has regarding no issues with behaviors and mentation has overall been stable and at baseline.  She does endorse some chronic arthralgias however nothing has changed as of late.  Otherwise, no additional issues or concerns at this time.        Review of Systems   Constitutional:  Positive for fatigue.   HENT: Negative.     Respiratory: Negative.     Cardiovascular: Negative.    Gastrointestinal: Negative.    Musculoskeletal: Negative.    Neurological:  Positive for weakness.   Psychiatric/Behavioral: Negative.         Objective Vitals Reviewed via facility EMR   Physical Exam  Constitutional:       General: She is not in acute distress.     Appearance: She is not ill-appearing.      Comments: Pleasant at baseline   Eyes:      Pupils: Pupils are equal, round, and reactive to light.   Cardiovascular:      Rate and Rhythm: Normal rate and regular rhythm.      Pulses: Normal pulses.      Heart sounds: No murmur heard.  Pulmonary:      Effort: No respiratory distress.      Breath sounds: No wheezing.   Abdominal:      General: Abdomen is flat. Bowel sounds are normal. There is no distension.   Musculoskeletal:      Right lower leg: No edema.      Left lower leg: No edema.   Skin:     General: Skin is warm and dry.   Neurological:      Mental Status: She is alert. Mental status is at baseline.      Cranial Nerves: No cranial nerve deficit.      Motor: Weakness present.   Psychiatric:         Mood and Affect: Mood normal.         Behavior: Behavior normal.         Assessment/Plan   Diagnoses and all orders for this visit:  Primary hypertension  Other schizophrenia  Other nontraumatic intracerebral hemorrhage, unspecified laterality (Multi)  Spastic hemiplegia of right dominant side due to noncerebrovascular etiology (Multi)        Patient seen and  examined at bedside.  Overall medically stable, does have underlying chronic arthralgias.  Continue fall precautions as well as dementia precautions.  No change in medications at this time discussed care plan with staff.  Noted no issues or concerns.    Reviewed and approved by AUSTIN MURRAY on 11/6/24 at 11:07 PM.

## 2024-12-18 ENCOUNTER — NURSING HOME VISIT (OUTPATIENT)
Dept: POST ACUTE CARE | Facility: EXTERNAL LOCATION | Age: 69
End: 2024-12-18
Payer: COMMERCIAL

## 2024-12-18 DIAGNOSIS — G89.29 OTHER CHRONIC PAIN: ICD-10-CM

## 2024-12-18 DIAGNOSIS — I10 PRIMARY HYPERTENSION: Primary | ICD-10-CM

## 2024-12-18 DIAGNOSIS — F20.89 OTHER SCHIZOPHRENIA: ICD-10-CM

## 2024-12-18 DIAGNOSIS — G81.11 SPASTIC HEMIPLEGIA OF RIGHT DOMINANT SIDE DUE TO NONCEREBROVASCULAR ETIOLOGY (MULTI): ICD-10-CM

## 2024-12-18 PROCEDURE — 99305 1ST NF CARE MODERATE MDM 35: CPT | Performed by: STUDENT IN AN ORGANIZED HEALTH CARE EDUCATION/TRAINING PROGRAM

## 2024-12-19 PROBLEM — G89.29 OTHER CHRONIC PAIN: Status: ACTIVE | Noted: 2024-12-19

## 2024-12-19 ASSESSMENT — ENCOUNTER SYMPTOMS
WEAKNESS: 1
CARDIOVASCULAR NEGATIVE: 1
ARTHRALGIAS: 1
GASTROINTESTINAL NEGATIVE: 1
PSYCHIATRIC NEGATIVE: 1
FATIGUE: 1
RESPIRATORY NEGATIVE: 1

## 2024-12-20 NOTE — PROGRESS NOTES
Subjective   Patient ID: Cristel Boyd is a 68 y.o. female.    68-year-old female with past medical history of schizoaffective disorder, hyperlipidemia  Drug-induced Parkinson's, CVA, degenerative disc disease, polysubstance abuse notably fentanyl, who has had multiple hospitalizations for underlying agitation going back to 2008.  Has been to the hospital multiple times, and was subsequently admitted due to underlying issues of agitation and restlessness.  This happened after the patient's daughter, moved back into her home after she was discharged from nursing facility.  Daughter felt like patient behaviors became more erratic, and brought the patient back for further management to the ED.  Patient was subsequently admitted for further management to psychiatric hospital due to significant issues with psychiatric disorders.  Patient was overall medically optimized, and was subsequently discharged to nursing facility in stable condition.  On evaluation, patient endorses no acute complaints she does endorse some significant arthralgias.  Endorses that this is chronic for her however was seen in the hospital for this and was advised nonopioid management.  Otherwise she states no acute issues or concerns.  Feels overall well.          Review of Systems   Constitutional:  Positive for fatigue.   HENT: Negative.     Respiratory: Negative.     Cardiovascular: Negative.    Gastrointestinal: Negative.    Musculoskeletal:  Positive for arthralgias.   Neurological:  Positive for weakness.   Psychiatric/Behavioral: Negative.         Objective Vitals Reviewed via facility EMR   Physical Exam  Constitutional:       General: She is not in acute distress.     Appearance: She is not ill-appearing.   Eyes:      Pupils: Pupils are equal, round, and reactive to light.   Cardiovascular:      Rate and Rhythm: Normal rate and regular rhythm.      Pulses: Normal pulses.      Heart sounds: No murmur heard.  Pulmonary:      Effort: No  respiratory distress.      Breath sounds: No wheezing.   Abdominal:      General: Abdomen is flat. Bowel sounds are normal. There is no distension.   Musculoskeletal:      Right lower leg: No edema.      Left lower leg: No edema.      Comments: Spastic hemiplegia   Skin:     General: Skin is warm and dry.   Neurological:      Mental Status: She is alert. Mental status is at baseline.      Cranial Nerves: No cranial nerve deficit.      Motor: Weakness present.   Psychiatric:         Mood and Affect: Mood normal.         Behavior: Behavior normal.         Assessment/Plan   Diagnoses and all orders for this visit:  Primary hypertension  Other schizophrenia  Spastic hemiplegia of right dominant side due to noncerebrovascular etiology (Multi)  Other chronic pain      Patient seen examined at bedside.  Overall medically stable, hospital course reviewed and medications reviewed and reconciled does have chronic issues with pain.  Her pain is overall out of proportion of exam.  Will hold off on addition of any additional narcotics at this time and will avoid in the future if possible.  Continue on Tylenol and redirection.  Encouraged continuation of physical therapy, no additional issues at this time.  Discussed care plan with staff.    Reviewed and approved by AUSTIN MURRAY on 12/19/24 at 10:51 PM.

## 2024-12-31 ENCOUNTER — NURSING HOME VISIT (OUTPATIENT)
Dept: POST ACUTE CARE | Facility: EXTERNAL LOCATION | Age: 69
End: 2024-12-31
Payer: COMMERCIAL

## 2024-12-31 DIAGNOSIS — G81.11 SPASTIC HEMIPLEGIA OF RIGHT DOMINANT SIDE DUE TO NONCEREBROVASCULAR ETIOLOGY (MULTI): ICD-10-CM

## 2024-12-31 DIAGNOSIS — F20.89 OTHER SCHIZOPHRENIA: ICD-10-CM

## 2024-12-31 DIAGNOSIS — M25.531 RIGHT WRIST PAIN: ICD-10-CM

## 2024-12-31 DIAGNOSIS — M19.90 CHRONIC ARTHRITIS: ICD-10-CM

## 2024-12-31 DIAGNOSIS — I10 PRIMARY HYPERTENSION: Primary | ICD-10-CM

## 2024-12-31 PROCEDURE — 99308 SBSQ NF CARE LOW MDM 20: CPT | Performed by: STUDENT IN AN ORGANIZED HEALTH CARE EDUCATION/TRAINING PROGRAM

## 2024-12-31 ASSESSMENT — ENCOUNTER SYMPTOMS
GASTROINTESTINAL NEGATIVE: 1
PSYCHIATRIC NEGATIVE: 1
CARDIOVASCULAR NEGATIVE: 1
NEUROLOGICAL NEGATIVE: 1
FATIGUE: 1
RESPIRATORY NEGATIVE: 1
ARTHRALGIAS: 1
BACK PAIN: 1

## 2025-01-01 NOTE — PROGRESS NOTES
Subjective   Patient ID: Cristel Boyd is a 69 y.o. female.    Patient seen and examined on routine evaluation.  She regards that she is overall doing well however is having significant shoulder pain.  Per staff as well as previous evaluations, this is fairly chronic and has had multiple x-rays done showing no acute pathology.  Patient is fairly fixated on this, does request evaluation in the emergency room, however discussed with patient that this is a chronic issue and can be evaluated as outpatient and patient is agreeable.  Otherwise regards no additional issues.        Review of Systems   Constitutional:  Positive for fatigue.   HENT: Negative.     Respiratory: Negative.     Cardiovascular: Negative.    Gastrointestinal: Negative.    Musculoskeletal:  Positive for arthralgias and back pain.   Neurological: Negative.    Psychiatric/Behavioral: Negative.         Objective Vitals Reviewed via facility EMR   Physical Exam  Constitutional:       General: She is not in acute distress.     Appearance: She is not ill-appearing.   Eyes:      Pupils: Pupils are equal, round, and reactive to light.   Cardiovascular:      Rate and Rhythm: Normal rate and regular rhythm.      Pulses: Normal pulses.      Heart sounds: No murmur heard.  Pulmonary:      Effort: No respiratory distress.      Breath sounds: No wheezing.   Abdominal:      General: Abdomen is flat. Bowel sounds are normal. There is no distension.   Musculoskeletal:         General: Tenderness present.      Right lower leg: No edema.      Left lower leg: No edema.      Comments: Difficulty with range of motion   Skin:     General: Skin is warm and dry.   Neurological:      Mental Status: She is alert. Mental status is at baseline.      Cranial Nerves: No cranial nerve deficit.      Motor: Weakness present.   Psychiatric:         Mood and Affect: Mood normal.         Behavior: Behavior normal.         Assessment/Plan   Diagnoses and all orders for this  visit:  Primary hypertension  Other schizophrenia  Spastic hemiplegia of right dominant side due to noncerebrovascular etiology (Multi)  Right wrist pain  Chronic arthritis    Patient seen and examined at bedside.  Overall medically stable, suspect underlying arthritis as patient has not had any recent falls, and this is a fairly chronic issue.  Recommend orthopedic follow-up, Medrol Dosepak to help with underlying inflammation.  Discussed care plan with staff and patient who are agreeable.    Reviewed and approved by AUSTIN MURRAY on 12/31/24 at 9:47 PM.

## 2025-01-30 ENCOUNTER — NURSING HOME VISIT (OUTPATIENT)
Dept: POST ACUTE CARE | Facility: EXTERNAL LOCATION | Age: 70
End: 2025-01-30
Payer: COMMERCIAL

## 2025-01-30 DIAGNOSIS — Z00.8 MEDICAL CLEARANCE FOR PSYCHIATRIC ADMISSION: Primary | ICD-10-CM

## 2025-01-30 DIAGNOSIS — G81.11 SPASTIC HEMIPLEGIA OF RIGHT DOMINANT SIDE DUE TO NONCEREBROVASCULAR ETIOLOGY (MULTI): ICD-10-CM

## 2025-01-30 DIAGNOSIS — I10 PRIMARY HYPERTENSION: ICD-10-CM

## 2025-01-30 DIAGNOSIS — G89.29 OTHER CHRONIC PAIN: ICD-10-CM

## 2025-01-30 PROCEDURE — 99309 SBSQ NF CARE MODERATE MDM 30: CPT | Performed by: STUDENT IN AN ORGANIZED HEALTH CARE EDUCATION/TRAINING PROGRAM

## 2025-01-30 ASSESSMENT — ENCOUNTER SYMPTOMS
FATIGUE: 1
RESPIRATORY NEGATIVE: 1
WEAKNESS: 1
ARTHRALGIAS: 1
AGITATION: 1
BACK PAIN: 1
CARDIOVASCULAR NEGATIVE: 1
GASTROINTESTINAL NEGATIVE: 1

## 2025-01-31 NOTE — PROGRESS NOTES
Subjective   Patient ID: Cristel Boyd is a 69 y.o. female.    Patient seen and examined at bedside.  Overall medically stable, however staff has regarding that she has been more withdrawn and less interactive as of late.  On evaluation, patient endorses that she does not feel like she wants to get out of bed, and that she overall has pain throughout her whole body.  Cannot endorse any pain in any specific area however endorses generalized aches and pains.  Per staff, psych has been evaluating the patient, and do recommend being transferred to psych for further evaluation as patient has become more withdrawn and is overall not interactive.  Patient regards no recent issues or current symptoms.  Otherwise states no additional issues or concerns at this time.        Review of Systems   Constitutional:  Positive for fatigue.   HENT: Negative.     Respiratory: Negative.     Cardiovascular: Negative.    Gastrointestinal: Negative.    Musculoskeletal:  Positive for arthralgias and back pain.   Neurological:  Positive for weakness.   Psychiatric/Behavioral:  Positive for agitation and behavioral problems.        Objective Vitals Reviewed via facility EMR   Physical Exam  Constitutional:       General: She is not in acute distress.     Appearance: She is not ill-appearing.   Eyes:      Pupils: Pupils are equal, round, and reactive to light.   Cardiovascular:      Rate and Rhythm: Normal rate and regular rhythm.      Pulses: Normal pulses.      Heart sounds: No murmur heard.  Pulmonary:      Effort: No respiratory distress.      Breath sounds: No wheezing.   Abdominal:      General: Abdomen is flat. Bowel sounds are normal. There is no distension.   Musculoskeletal:         General: Tenderness present. No swelling or deformity.      Right lower leg: No edema.      Left lower leg: No edema.      Comments: Pain out of proportion of exam   Skin:     General: Skin is warm and dry.   Neurological:      Mental Status: She is  alert. Mental status is at baseline.      Cranial Nerves: No cranial nerve deficit.      Motor: Weakness present.   Psychiatric:         Mood and Affect: Mood normal.         Behavior: Behavior normal.         Assessment/Plan   Diagnoses and all orders for this visit:  Medical clearance for psychiatric admission  Primary hypertension  Spastic hemiplegia of right dominant side due to noncerebrovascular etiology (Multi)  Other chronic pain      Patient seen and examined at bedside.  Overall medically stable however likely would need medical clearance and rule out of any metabolic causes prior to discharge to psychiatric facility.  Recommend CBC CMP mag ESR CRP iron TIBC, as well as thyroid studies.  Obtain a UA.  Encourage patient to be out and interactive within the facility.  Could consider SNRI to potentially help with adjunct pain control however defer to psychiatry.  Otherwise no change in medications at this time.  Continue supportive care.    Reviewed and approved by AUSTIN MURRAY on 1/30/25 at 10:17 PM.

## 2025-02-16 ENCOUNTER — APPOINTMENT (OUTPATIENT)
Dept: RADIOLOGY | Facility: HOSPITAL | Age: 70
End: 2025-02-16
Payer: COMMERCIAL

## 2025-02-16 ENCOUNTER — HOSPITAL ENCOUNTER (EMERGENCY)
Facility: HOSPITAL | Age: 70
Discharge: HOME | End: 2025-02-16
Attending: EMERGENCY MEDICINE
Payer: COMMERCIAL

## 2025-02-16 VITALS
TEMPERATURE: 97.5 F | RESPIRATION RATE: 16 BRPM | WEIGHT: 130 LBS | HEIGHT: 64 IN | HEART RATE: 62 BPM | DIASTOLIC BLOOD PRESSURE: 78 MMHG | BODY MASS INDEX: 22.2 KG/M2 | SYSTOLIC BLOOD PRESSURE: 119 MMHG | OXYGEN SATURATION: 100 %
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
## 2025-02-16 PROCEDURE — 73070 X-RAY EXAM OF ELBOW: CPT | Mod: RT

## 2025-02-16 PROCEDURE — 70450 CT HEAD/BRAIN W/O DYE: CPT | Performed by: RADIOLOGY

## 2025-02-16 PROCEDURE — 70450 CT HEAD/BRAIN W/O DYE: CPT

## 2025-02-16 PROCEDURE — 72125 CT NECK SPINE W/O DYE: CPT | Performed by: RADIOLOGY

## 2025-02-16 PROCEDURE — 99284 EMERGENCY DEPT VISIT MOD MDM: CPT | Mod: 25 | Performed by: EMERGENCY MEDICINE

## 2025-02-16 PROCEDURE — 73030 X-RAY EXAM OF SHOULDER: CPT | Mod: RIGHT SIDE | Performed by: RADIOLOGY

## 2025-02-16 PROCEDURE — 73200 CT UPPER EXTREMITY W/O DYE: CPT | Mod: RIGHT SIDE | Performed by: RADIOLOGY

## 2025-02-16 PROCEDURE — 2500000001 HC RX 250 WO HCPCS SELF ADMINISTERED DRUGS (ALT 637 FOR MEDICARE OP)

## 2025-02-16 PROCEDURE — 73030 X-RAY EXAM OF SHOULDER: CPT | Mod: RT

## 2025-02-16 PROCEDURE — 73110 X-RAY EXAM OF WRIST: CPT | Mod: RIGHT SIDE | Performed by: RADIOLOGY

## 2025-02-16 PROCEDURE — 73200 CT UPPER EXTREMITY W/O DYE: CPT | Mod: RT

## 2025-02-16 PROCEDURE — 73110 X-RAY EXAM OF WRIST: CPT | Mod: RT

## 2025-02-16 PROCEDURE — 72125 CT NECK SPINE W/O DYE: CPT

## 2025-02-16 PROCEDURE — 73070 X-RAY EXAM OF ELBOW: CPT | Mod: RIGHT SIDE | Performed by: RADIOLOGY

## 2025-02-16 RX ORDER — ACETAMINOPHEN 325 MG/1
975 TABLET ORAL ONCE
Status: COMPLETED | OUTPATIENT
Start: 2025-02-16 | End: 2025-02-16

## 2025-02-16 RX ADMIN — ACETAMINOPHEN 975 MG: 325 TABLET ORAL at 16:52

## 2025-02-16 ASSESSMENT — LIFESTYLE VARIABLES
HAVE PEOPLE ANNOYED YOU BY CRITICIZING YOUR DRINKING: NO
TOTAL SCORE: 0
EVER HAD A DRINK FIRST THING IN THE MORNING TO STEADY YOUR NERVES TO GET RID OF A HANGOVER: NO
HAVE YOU EVER FELT YOU SHOULD CUT DOWN ON YOUR DRINKING: NO
EVER FELT BAD OR GUILTY ABOUT YOUR DRINKING: NO

## 2025-02-16 ASSESSMENT — PAIN DESCRIPTION - LOCATION: LOCATION: ARM

## 2025-02-16 ASSESSMENT — PAIN - FUNCTIONAL ASSESSMENT
PAIN_FUNCTIONAL_ASSESSMENT: 0-10

## 2025-02-16 ASSESSMENT — PAIN SCALES - GENERAL
PAINLEVEL_OUTOF10: 8
PAINLEVEL_OUTOF10: 0 - NO PAIN
PAINLEVEL_OUTOF10: 3

## 2025-02-16 ASSESSMENT — PAIN DESCRIPTION - PROGRESSION: CLINICAL_PROGRESSION: GRADUALLY IMPROVING

## 2025-02-16 ASSESSMENT — COLUMBIA-SUICIDE SEVERITY RATING SCALE - C-SSRS
1. IN THE PAST MONTH, HAVE YOU WISHED YOU WERE DEAD OR WISHED YOU COULD GO TO SLEEP AND NOT WAKE UP?: NO
2. HAVE YOU ACTUALLY HAD ANY THOUGHTS OF KILLING YOURSELF?: NO
6. HAVE YOU EVER DONE ANYTHING, STARTED TO DO ANYTHING, OR PREPARED TO DO ANYTHING TO END YOUR LIFE?: NO

## 2025-02-16 ASSESSMENT — PAIN DESCRIPTION - PAIN TYPE: TYPE: ACUTE PAIN

## 2025-02-16 ASSESSMENT — PAIN DESCRIPTION - ORIENTATION: ORIENTATION: RIGHT

## 2025-02-16 NOTE — ED PROVIDER NOTES
EMERGENCY DEPARTMENT ENCOUNTER      Pt Name: Cristel Boyd  MRN: [id number]  Birthdate 1955  Date of evaluation: 2/16/2025  Provider: Shimon Root MD    CHIEF COMPLAINT       Chief Complaint   Patient presents with    Fall     Witnessed slide out of bed at facility, no LOC, no blood thinners. Pt c/o right arm pain and neck pain. C-collar placed by EMS in field.          HISTORY OF PRESENT ILLNESS    HPI  Patient 69-year-old female with history of schizophrenia, bipolar disorder, opiate abuse, HTN presenting after a fall.  Patient was in bed when she attempted to get out without the assistance of an aide.  She lost her balance, slipped from a semiseated position, hitting her rear end and striking the right side of her head.  She did not lose consciousness.  Denies headache, visual changes, nausea, vomiting, chest pain, shortness of breath.  She is primarily complaining about pain to the right side of her head, neck, and diffuse pain from the shoulder down to her wrist.    Nursing Notes were reviewed.    PAST MEDICAL HISTORY   History reviewed. No pertinent past medical history.      SURGICAL HISTORY     History reviewed. No pertinent surgical history.      CURRENT MEDICATIONS       Previous Medications    No medications on file       ALLERGIES     Lidocaine    FAMILY HISTORY     No family history on file.       SOCIAL HISTORY       Social History     Socioeconomic History    Marital status:      Social Drivers of Health     Housing Stability: High Risk (12/2/2024)    Received from [facility] Scre[facility]     What is your living situation today?: I have a place to live today, but i am worried about losing it in the future       SCREENINGS                        PHYSICAL EXAM    (up to 7 for level 4, 8 or more for level 5)     ED Triage Vitals [02/16/25 1636]   Temperature Heart Rate Respirations BP   36.4 °C (97.5 °F) 57 16 124/85      Pulse Ox Temp Source Heart Rate Source Patient  Position   100 % Temporal -- --      BP Location FiO2 (%)     -- --       Physical Exam  Constitutional:       General: She is not in acute distress.     Appearance: She is not ill-appearing.   HENT:      Head: Normocephalic and atraumatic.      Nose: Nose normal.      Mouth/Throat:      Mouth: Mucous membranes are moist.      Pharynx: Oropharynx is clear.   Eyes:      Extraocular Movements: Extraocular movements intact.      Conjunctiva/sclera: Conjunctivae normal.   Neck:      Comments: In cervical collar.  Mid cervical spine is tender to palpation.  No palpable step-offs or deformities.  Cardiovascular:      Rate and Rhythm: Normal rate and regular rhythm.      Pulses: Normal pulses.      Heart sounds: Normal heart sounds.   Pulmonary:      Effort: Pulmonary effort is normal. No respiratory distress.      Breath sounds: Normal breath sounds.   Abdominal:      General: There is no distension.      Palpations: Abdomen is soft.      Tenderness: There is no abdominal tenderness.   Musculoskeletal:         General: No deformity.      Right lower leg: No edema.      Left lower leg: No edema.      Comments: Pain to palpation of the right shoulder, elbow, wrist.  Patient is refusing to move all of these joints.  Distal limb is neurovascularly intact.   Skin:     General: Skin is warm and dry.   Neurological:      General: No focal deficit present.          DIAGNOSTIC RESULTS     LABS:  Labs Reviewed - No data to display    All other labs were within normal range or not returned as of this dictation.    Imaging  CT shoulder right wo IV contrast   Final Result   No acute osseous abnormality.        Severe glenohumeral osteoarthrosis.             MACRO:   None        Signed by: Jenna Plummer 2/16/2025 7:44 PM   Dictation workstation:   [id number]      XR shoulder right 2+ views   Final Result   No definite fracture. Chronic degenerative changes of the shoulder   with irregularity of the articular surface limited  evaluation for   subtle nondisplaced fracture. If clinical concern persists, consider   CT.             MACRO:   None        Signed by: Jenna Plummer 2/16/2025 6:18 PM   Dictation workstation:   [id number]      XR wrist right 3+ views   Final Result   No acute osseous abnormality.             MACRO:   None        Signed by: Jenna Plummer 2/16/2025 6:19 PM   Dictation workstation:   [id number]      XR elbow right 1-2 views   Final Result   No fracture.             MACRO:   None        Signed by: Raudel Arguelles 2/16/2025 6:12 PM   Dictation workstation:   [id number]      CT head wo IV contrast   Final Result   No evidence of acute cortical infarct or intracranial hemorrhage.        MACRO:   None        Signed by: Astrid Keyes 2/16/2025 6:04 PM   Dictation workstation:   [id number]      CT cervical spine wo IV contrast   Final Result   1. No evidence for an acute fracture or subluxation of the cervical   spine.        MACRO:   None        Signed by: Astrid Keyes 2/16/2025 6:06 PM   Dictation workstation:   [id number]           Procedures  Procedures     EMERGENCY DEPARTMENT COURSE/MDM:     Diagnoses as of 02/16/25 1951   Fall, initial encounter        Medical Decision Making  History obtained from the patient and EMS.  Records including labs, imaging, notes independently reviewed by me.  Presentation concerning for possible skull, cervical, shoulder, elbow, wrist fracture.  Patient otherwise neurologically intact.  No indication for labs for a mechanical fall at this time.  Patient given Tylenol for pain.  CT head and cervical spine were negative.  Cervical collar cleared clinically and radiographically.  X-rays of the elbow and wrist did not demonstrate any acute abnormalities.  X-ray of the shoulder was equivocal with CT recommended by radiology.  CT scan of the shoulder did not demonstrate any acute osseous abnormality.  Patient was given reassurance and discharged back to her nursing facility in  satisfactory condition.  All questions answered and return precautions discussed.    Patient and or family in agreement and understanding of treatment plan.  All questions answered.      I reviewed the case with the attending ED physician. The attending ED physician agrees with the plan. Patient and/or patient´s representative was counseled regarding labs, imaging, likely diagnosis, and plan. All questions were answered.    ED Medications administered this visit:    Medications   acetaminophen (Tylenol) tablet 975 mg (975 mg oral Given 2/16/25 1652)       New Prescriptions from this visit:    New Prescriptions    No medications on file       Follow-up:  No follow-up provider specified.      Final Impression:   1. Fall, initial encounter          (Please note that portions of this note were completed with a voice recognition program.  Efforts were made to edit the dictations but occasionally words are mis-transcribed.)     Shimon Root MD  Resident  02/16/25 6032

## 2025-02-17 NOTE — DISCHARGE INSTRUCTIONS
Beatrice did not have any fractures or bleeding on her imaging.  If she develops the worst headache of her life, uncontrollable vomiting, or confusion, return her to the ER.

## 2025-02-19 ENCOUNTER — NURSING HOME VISIT (OUTPATIENT)
Dept: POST ACUTE CARE | Facility: EXTERNAL LOCATION | Age: 70
End: 2025-02-19
Payer: COMMERCIAL

## 2025-02-19 DIAGNOSIS — G89.29 OTHER CHRONIC PAIN: ICD-10-CM

## 2025-02-19 DIAGNOSIS — F20.89 OTHER SCHIZOPHRENIA: Primary | ICD-10-CM

## 2025-02-19 DIAGNOSIS — R19.7 DIARRHEA OF PRESUMED INFECTIOUS ORIGIN: ICD-10-CM

## 2025-02-19 DIAGNOSIS — G81.11 SPASTIC HEMIPLEGIA OF RIGHT DOMINANT SIDE DUE TO NONCEREBROVASCULAR ETIOLOGY (MULTI): ICD-10-CM

## 2025-02-19 DIAGNOSIS — I10 PRIMARY HYPERTENSION: ICD-10-CM

## 2025-02-19 PROCEDURE — 99308 SBSQ NF CARE LOW MDM 20: CPT | Performed by: STUDENT IN AN ORGANIZED HEALTH CARE EDUCATION/TRAINING PROGRAM

## 2025-02-20 PROBLEM — R19.7 DIARRHEA OF PRESUMED INFECTIOUS ORIGIN: Status: ACTIVE | Noted: 2025-02-20

## 2025-02-20 ASSESSMENT — ENCOUNTER SYMPTOMS
PSYCHIATRIC NEGATIVE: 1
FATIGUE: 1
DIARRHEA: 1
WEAKNESS: 1
CARDIOVASCULAR NEGATIVE: 1
RESPIRATORY NEGATIVE: 1
ARTHRALGIAS: 1

## 2025-02-21 NOTE — PROGRESS NOTES
Subjective   Patient ID: Cristel Boyd is a 69 y.o. female.    Patient seen and examined on routine evaluation, staff is regarding that patient recently has been experiencing nausea and vomiting as well as persistent diarrhea.  Patient endorses that this recently started about 24 hours ago.  She states that she is significantly nauseous, however denies any lightheaded or dizziness.  She is able to keep her food down at this time.  Otherwise, states no additional issues or concerns at this time.  Staff also endorses that they are likely will try to transition the patient to the memory care unit of the facility.  Otherwise, no additional issues at this time.         Review of Systems   Constitutional:  Positive for fatigue.   HENT: Negative.     Respiratory: Negative.     Cardiovascular: Negative.    Gastrointestinal:  Positive for diarrhea.   Musculoskeletal:  Positive for arthralgias.   Neurological:  Positive for weakness.   Psychiatric/Behavioral: Negative.         Objective Vitals Reviewed via facility EMR   Physical Exam  Constitutional:       General: She is not in acute distress.     Appearance: She is not ill-appearing.   Eyes:      Pupils: Pupils are equal, round, and reactive to light.   Cardiovascular:      Rate and Rhythm: Normal rate and regular rhythm.      Pulses: Normal pulses.      Heart sounds: No murmur heard.  Pulmonary:      Effort: No respiratory distress.      Breath sounds: No wheezing.   Abdominal:      General: Abdomen is flat. Bowel sounds are normal. There is no distension.   Musculoskeletal:      Right lower leg: No edema.      Left lower leg: No edema.   Skin:     General: Skin is warm and dry.   Neurological:      Mental Status: She is alert. Mental status is at baseline.      Cranial Nerves: No cranial nerve deficit.      Motor: Weakness present.   Psychiatric:         Mood and Affect: Mood normal.         Behavior: Behavior normal.         Assessment/Plan   Diagnoses and all orders  for this visit:  Other schizophrenia  Spastic hemiplegia of right dominant side due to noncerebrovascular etiology (Multi)  Primary hypertension  Other chronic pain  Diarrhea of presumed infectious origin      Patient seen and examined at bedside.  Overall medically stable however does have a likely viral GI illness.  Recommend continue supportive care, as needed Zofran.  Will obtain routine labs with CBC and BMP to evaluate for infectious and metabolic etiologies.  Otherwise no additional issues at this time continue supportive care.     Reviewed and approved by AUSTIN MURRAY on 2/20/25 at 9:13 PM.

## 2025-04-10 ENCOUNTER — NURSING HOME VISIT (OUTPATIENT)
Dept: POST ACUTE CARE | Facility: EXTERNAL LOCATION | Age: 70
End: 2025-04-10
Payer: COMMERCIAL

## 2025-04-10 DIAGNOSIS — G89.29 OTHER CHRONIC PAIN: ICD-10-CM

## 2025-04-10 DIAGNOSIS — I10 PRIMARY HYPERTENSION: Primary | ICD-10-CM

## 2025-04-10 DIAGNOSIS — G81.11 SPASTIC HEMIPLEGIA OF RIGHT DOMINANT SIDE DUE TO NONCEREBROVASCULAR ETIOLOGY (MULTI): ICD-10-CM

## 2025-04-10 DIAGNOSIS — G24.01 TARDIVE DYSKINESIA: ICD-10-CM

## 2025-04-10 DIAGNOSIS — F20.89 OTHER SCHIZOPHRENIA: ICD-10-CM

## 2025-04-12 PROBLEM — G24.01 TARDIVE DYSKINESIA: Status: ACTIVE | Noted: 2025-04-12

## 2025-04-12 ASSESSMENT — ENCOUNTER SYMPTOMS
ARTHRALGIAS: 1
PSYCHIATRIC NEGATIVE: 1
RESPIRATORY NEGATIVE: 1
CARDIOVASCULAR NEGATIVE: 1
WEAKNESS: 1
GASTROINTESTINAL NEGATIVE: 1
CONSTITUTIONAL NEGATIVE: 1

## 2025-04-12 NOTE — PROGRESS NOTES
Subjective   Patient ID: Cristel Boyd is a 69 y.o. female.    Patient seen and examined on routine evaluation, she relates that she is overall doing well, with regards to her behaviors have overall been stable, does have some underlying cardiac dyskinesia however this is chronic.  Otherwise, no additional issues at this time.            Review of Systems   Constitutional: Negative.    HENT: Negative.     Respiratory: Negative.     Cardiovascular: Negative.    Gastrointestinal: Negative.    Musculoskeletal:  Positive for arthralgias.   Neurological:  Positive for weakness.   Psychiatric/Behavioral: Negative.         Objective Vitals Reviewed via facility EMR   Physical Exam  Constitutional:       General: She is not in acute distress.     Appearance: She is not ill-appearing.   Eyes:      Pupils: Pupils are equal, round, and reactive to light.   Cardiovascular:      Rate and Rhythm: Normal rate and regular rhythm.      Pulses: Normal pulses.      Heart sounds: No murmur heard.  Pulmonary:      Effort: No respiratory distress.      Breath sounds: No wheezing.   Abdominal:      General: Abdomen is flat. Bowel sounds are normal. There is no distension.   Musculoskeletal:      Right lower leg: No edema.      Left lower leg: No edema.   Skin:     General: Skin is warm and dry.   Neurological:      Mental Status: She is alert. Mental status is at baseline.      Cranial Nerves: No cranial nerve deficit.      Motor: No weakness.      Comments: Lip smacking consistent with TD   Psychiatric:         Mood and Affect: Mood normal.         Behavior: Behavior normal.         Assessment/Plan   Diagnoses and all orders for this visit:  Primary hypertension  Spastic hemiplegia of right dominant side due to noncerebrovascular etiology (Multi)  Other chronic pain  Other schizophrenia  Tardive dyskinesia      Patient seen and examined in routine evaluation, overall medically stable continue optimization of activities of daily  living, no change in medications at this time.  Continue supportive care.  Fall precautions.  Unfortunately, does have underlying tardive dyskinesia, will continue to monitor symptoms as below, seems to be significantly affecting her quality of life would avoid medications at this time as patient is a high fall risk, so would be concerned about further injuries with usage of medications due to side effects.  Otherwise, no change in medications at this time continue supportive care.     Reviewed and approved by AUSTIN MURRAY on 4/12/25 at 1:39 PM.

## 2025-04-30 ENCOUNTER — APPOINTMENT (OUTPATIENT)
Dept: CARDIOLOGY | Facility: HOSPITAL | Age: 70
End: 2025-04-30
Payer: COMMERCIAL

## 2025-04-30 ENCOUNTER — HOSPITAL ENCOUNTER (EMERGENCY)
Facility: HOSPITAL | Age: 70
Discharge: PSYCHIATRIC OR SUBSTANCE USE FACILITY | End: 2025-05-01
Attending: EMERGENCY MEDICINE
Payer: COMMERCIAL

## 2025-04-30 DIAGNOSIS — F22 PARANOID DELUSION (MULTI): Primary | ICD-10-CM

## 2025-04-30 LAB
ALBUMIN SERPL BCP-MCNC: 3.6 G/DL (ref 3.4–5)
ALP SERPL-CCNC: 51 U/L (ref 33–136)
ALT SERPL W P-5'-P-CCNC: 7 U/L (ref 7–45)
AMPHETAMINES UR QL SCN: NORMAL
ANION GAP SERPL CALC-SCNC: 12 MMOL/L (ref 10–20)
APAP SERPL-MCNC: 11.7 UG/ML (ref ?–30)
APPEARANCE UR: CLEAR
AST SERPL W P-5'-P-CCNC: 19 U/L (ref 9–39)
BARBITURATES UR QL SCN: NORMAL
BASOPHILS # BLD AUTO: 0.02 X10*3/UL (ref 0–0.1)
BASOPHILS NFR BLD AUTO: 0.5 %
BENZODIAZ UR QL SCN: NORMAL
BILIRUB SERPL-MCNC: 0.3 MG/DL (ref 0–1.2)
BILIRUB UR STRIP.AUTO-MCNC: NEGATIVE MG/DL
BUN SERPL-MCNC: 34 MG/DL (ref 6–23)
BZE UR QL SCN: NORMAL
CALCIUM SERPL-MCNC: 9.7 MG/DL (ref 8.6–10.3)
CANNABINOIDS UR QL SCN: NORMAL
CHLORIDE SERPL-SCNC: 105 MMOL/L (ref 98–107)
CO2 SERPL-SCNC: 27 MMOL/L (ref 21–32)
COLOR UR: YELLOW
CREAT SERPL-MCNC: 0.84 MG/DL (ref 0.5–1.05)
EGFRCR SERPLBLD CKD-EPI 2021: 75 ML/MIN/1.73M*2
EOSINOPHIL # BLD AUTO: 0.13 X10*3/UL (ref 0–0.7)
EOSINOPHIL NFR BLD AUTO: 2.9 %
ERYTHROCYTE [DISTWIDTH] IN BLOOD BY AUTOMATED COUNT: 14.2 % (ref 11.5–14.5)
ETHANOL SERPL-MCNC: <10 MG/DL
FENTANYL+NORFENTANYL UR QL SCN: NORMAL
GLUCOSE SERPL-MCNC: 80 MG/DL (ref 74–99)
GLUCOSE UR STRIP.AUTO-MCNC: NEGATIVE MG/DL
HCT VFR BLD AUTO: 33.4 % (ref 36–46)
HGB BLD-MCNC: 11 G/DL (ref 12–16)
HYALINE CASTS #/AREA URNS AUTO: ABNORMAL /LPF
IMM GRANULOCYTES # BLD AUTO: 0.01 X10*3/UL (ref 0–0.7)
IMM GRANULOCYTES NFR BLD AUTO: 0.2 % (ref 0–0.9)
KETONES UR STRIP.AUTO-MCNC: ABNORMAL MG/DL
LEUKOCYTE ESTERASE UR QL STRIP.AUTO: ABNORMAL
LYMPHOCYTES # BLD AUTO: 1.64 X10*3/UL (ref 1.2–4.8)
LYMPHOCYTES NFR BLD AUTO: 37 %
MCH RBC QN AUTO: 31.3 PG (ref 26–34)
MCHC RBC AUTO-ENTMCNC: 32.9 G/DL (ref 32–36)
MCV RBC AUTO: 95 FL (ref 80–100)
METHADONE UR QL SCN: NORMAL
MONOCYTES # BLD AUTO: 0.45 X10*3/UL (ref 0.1–1)
MONOCYTES NFR BLD AUTO: 10.2 %
MUCOUS THREADS #/AREA URNS AUTO: ABNORMAL /LPF
NEUTROPHILS # BLD AUTO: 2.18 X10*3/UL (ref 1.2–7.7)
NEUTROPHILS NFR BLD AUTO: 49.2 %
NITRITE UR QL STRIP.AUTO: NEGATIVE
NRBC BLD-RTO: 0 /100 WBCS (ref 0–0)
OPIATES UR QL SCN: NORMAL
OXYCODONE+OXYMORPHONE UR QL SCN: NORMAL
PCP UR QL SCN: NORMAL
PH UR STRIP.AUTO: 6.5 [PH]
PLATELET # BLD AUTO: 210 X10*3/UL (ref 150–450)
POTASSIUM SERPL-SCNC: 3.9 MMOL/L (ref 3.5–5.3)
PROT SERPL-MCNC: 6.7 G/DL (ref 6.4–8.2)
PROT UR STRIP.AUTO-MCNC: NEGATIVE MG/DL
RBC # BLD AUTO: 3.52 X10*6/UL (ref 4–5.2)
RBC # UR STRIP.AUTO: NEGATIVE MG/DL
RBC #/AREA URNS AUTO: ABNORMAL /HPF
SALICYLATES SERPL-MCNC: <3 MG/DL (ref ?–20)
SODIUM SERPL-SCNC: 140 MMOL/L (ref 136–145)
SP GR UR STRIP.AUTO: 1.02
SQUAMOUS #/AREA URNS AUTO: ABNORMAL /HPF
TSH SERPL-ACNC: 2.33 MIU/L (ref 0.44–3.98)
UROBILINOGEN UR STRIP.AUTO-MCNC: ABNORMAL MG/DL
WBC # BLD AUTO: 4.4 X10*3/UL (ref 4.4–11.3)
WBC #/AREA URNS AUTO: ABNORMAL /HPF

## 2025-04-30 PROCEDURE — 80307 DRUG TEST PRSMV CHEM ANLYZR: CPT | Performed by: PHYSICIAN ASSISTANT

## 2025-04-30 PROCEDURE — 93005 ELECTROCARDIOGRAM TRACING: CPT

## 2025-04-30 PROCEDURE — 80143 DRUG ASSAY ACETAMINOPHEN: CPT | Performed by: PHYSICIAN ASSISTANT

## 2025-04-30 PROCEDURE — 87086 URINE CULTURE/COLONY COUNT: CPT | Mod: ELYLAB | Performed by: PHYSICIAN ASSISTANT

## 2025-04-30 PROCEDURE — 99285 EMERGENCY DEPT VISIT HI MDM: CPT | Performed by: EMERGENCY MEDICINE

## 2025-04-30 PROCEDURE — 2500000001 HC RX 250 WO HCPCS SELF ADMINISTERED DRUGS (ALT 637 FOR MEDICARE OP): Performed by: PHYSICIAN ASSISTANT

## 2025-04-30 PROCEDURE — 84443 ASSAY THYROID STIM HORMONE: CPT | Performed by: PHYSICIAN ASSISTANT

## 2025-04-30 PROCEDURE — 36415 COLL VENOUS BLD VENIPUNCTURE: CPT | Performed by: PHYSICIAN ASSISTANT

## 2025-04-30 PROCEDURE — 80053 COMPREHEN METABOLIC PANEL: CPT | Performed by: PHYSICIAN ASSISTANT

## 2025-04-30 PROCEDURE — 85025 COMPLETE CBC W/AUTO DIFF WBC: CPT | Performed by: PHYSICIAN ASSISTANT

## 2025-04-30 PROCEDURE — 80320 DRUG SCREEN QUANTALCOHOLS: CPT | Performed by: PHYSICIAN ASSISTANT

## 2025-04-30 PROCEDURE — 81001 URINALYSIS AUTO W/SCOPE: CPT | Performed by: PHYSICIAN ASSISTANT

## 2025-04-30 RX ORDER — SULFACETAMIDE SODIUM 100 MG/ML
2 SOLUTION/ DROPS OPHTHALMIC
Status: DISCONTINUED | OUTPATIENT
Start: 2025-04-30 | End: 2025-05-01 | Stop reason: HOSPADM

## 2025-04-30 RX ADMIN — SULFACETAMIDE SODIUM 2 DROP: 100 SOLUTION/ DROPS OPHTHALMIC at 19:51

## 2025-04-30 RX ADMIN — SULFACETAMIDE SODIUM 2 DROP: 100 SOLUTION/ DROPS OPHTHALMIC at 22:10

## 2025-04-30 ASSESSMENT — COLUMBIA-SUICIDE SEVERITY RATING SCALE - C-SSRS
2. HAVE YOU ACTUALLY HAD ANY THOUGHTS OF KILLING YOURSELF?: NO
6. HAVE YOU EVER DONE ANYTHING, STARTED TO DO ANYTHING, OR PREPARED TO DO ANYTHING TO END YOUR LIFE?: NO
1. IN THE PAST MONTH, HAVE YOU WISHED YOU WERE DEAD OR WISHED YOU COULD GO TO SLEEP AND NOT WAKE UP?: NO

## 2025-04-30 ASSESSMENT — LIFESTYLE VARIABLES
EVER HAD A DRINK FIRST THING IN THE MORNING TO STEADY YOUR NERVES TO GET RID OF A HANGOVER: NO
EVER FELT BAD OR GUILTY ABOUT YOUR DRINKING: NO
HAVE YOU EVER FELT YOU SHOULD CUT DOWN ON YOUR DRINKING: NO
HAVE PEOPLE ANNOYED YOU BY CRITICIZING YOUR DRINKING: NO
TOTAL SCORE: 0

## 2025-04-30 ASSESSMENT — PAIN - FUNCTIONAL ASSESSMENT: PAIN_FUNCTIONAL_ASSESSMENT: 0-10

## 2025-04-30 ASSESSMENT — PAIN SCALES - GENERAL
PAINLEVEL_OUTOF10: 0 - NO PAIN
PAINLEVEL_OUTOF10: 0 - NO PAIN
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
## 2025-04-30 NOTE — ED NOTES
"Call from Representative of [facility] who identified themself as \"Constant\".  Per representative pt was \"we sent her there for medical clearance and she is already accepted to [facility] where she needs to go.\"  This RN asked if the facility was aware patient is stating she does not want to go to [facility].  Facilities response was \"well they said they'd take her there.\".  This RN repeated that and representative  confirmed that no psych evaluation had [facility], but \"they said they would take here and she needs to go.\".  This RN told representative that provider will be made aware that patient is A&Ox4 and not wanting to go to [facility] and has not had a psych evaluation.      Ann Marie De La Rosa RN  04/30/25 1928    "
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
## 2025-05-01 VITALS
RESPIRATION RATE: 18 BRPM | HEIGHT: 65 IN | SYSTOLIC BLOOD PRESSURE: 118 MMHG | DIASTOLIC BLOOD PRESSURE: 67 MMHG | WEIGHT: 125 LBS | TEMPERATURE: 98.1 F | BODY MASS INDEX: 20.83 KG/M2 | OXYGEN SATURATION: 95 % | HEART RATE: 84 BPM

## 2025-05-01 LAB — HOLD SPECIMEN: NORMAL

## 2025-05-01 NOTE — SIGNIFICANT EVENT
Application for Emergency Admission      Ready for Transfer?  Is the patient medically cleared for transfer to inpatient psychiatry: Yes  Has the patient been accepted to an inpatient psychiatric hospital: Yes    Application for Emergency Admission  IN ACCORDANCE [place]  The Chief Clinical Officer of: Yakelin Shabazz 4/30/2025 .11:58 PM    Reason for Hospitalization  The undersigned has reason to believe that: Cristel Boyd Is a mentally ill person subject to hospitalization by court order under division B Section 5122.01 of the Revised Code, i.e., this person:    1.No  Represents a substantial risk of physical harm to self as manifested by evidence of threats of, or attempts at, suicide or serious self-inflicted bodily harm    2.No Represents a substantial risk of physical harm to others as manifested by evidence of recent homicidal or other violent behavior, evidence of recent threats that place another in reasonable fear of violent behavior and serious physical harm, or other evidence of present dangerousness    3.Yes Represents a substantial and immediate risk of serious physical impairment or injury to self as manifested by  evidence that the person is unable to provide for and is not providing for the person's basic physical needs because of the person's mental illness and that appropriate provision for those needs cannot be made  immediately available in the community    4.Yes Would benefit from treatment in a hospital for his mental illness and is in need of such treatment as manifested by evidence of behavior that creates a grave and imminent risk to substantial rights of others or  himself.    5.Yes Would benefit from treatment as manifested by evidence of behavior that indicates all of the following:       (a) The person is unlikely to survive safely in the community without supervision, based on a clinical determination.       (b) The person has a history of lack of compliance with  treatment for mental illness and one of the following applies:      (i) At least twice within the thirty-six months prior to the filing of an affidavit seeking court-ordered treatment of the person under section 5122.111 of the Revised Code, the lack of compliance has been a significant factor in necessitating hospitalization in a hospital or receipt of services in a forensic or other mental health unit of a correctional facility, provided that the thirty-six-month period shall be extended by the length of any hospitalization or incarceration of the person that occurred within the thirty-six-month period.      (ii) Within the forty-eight months prior to the filing of an affidavit seeking court-ordered treatment of the person under section 5122.111 of the Revised Code, the lack of compliance resulted in one or more acts of serious violent behavior toward self or others or threats of, or attempts at, serious physical harm to self or others, provided that the forty-eight-month period shall be extended by the length of any hospitalization or incarceration of the person that occurred within the forty-eight-month period.      (c) The person, as a result of mental illness, is unlikely to voluntarily participate in necessary treatment.       (d) In view of the person's treatment history and current behavior, the person is in need of treatment in order to prevent a relapse or deterioration that would be likely to result in substantial risk of serious harm to the person or others.    (e) Represents a substantial risk of physical harm to self or others if allowed to remain at liberty pending examination.    Therefore, it is requested that said person be admitted to the above named facility.    STATEMENT OF BELIEF    Must be filled out by one of the following: a psychiatrist, licensed physician, licensed clinical psychologist, health or ,  or .  (Statement shall include the circumstances under  which the individual was taken into custody and the reason for the person's belief that hospitalization is necessary. The statement shall also include a reference to efforts made to secure the individual's property at his residence if he was taken into custody there. Every reasonable and appropriate effort should be made to take this person into custody in the least conspicuous manner possible.)    Paranoid delusion     Patricia Khan MD 4/30/2025     _____________________________________________________________   Place of Employment: [facility] [employer] [facility] [employer]    STATEMENT OF OBSERVATION BY PSYCHIATRIST, LICENSED PHYSICIAN, OR LICENSED CLINICAL PSYCHOLOGIST, IF APPLICABLE    Place of Observation (e.g., [facility] center, general hospital, office, emergency facility)  (If applicable, please complete)    Patricia Khan MD 4/30/2025    _____________________________________________________________

## 2025-05-02 LAB — BACTERIA UR CULT: NO GROWTH

## 2025-05-15 ENCOUNTER — APPOINTMENT (OUTPATIENT)
Dept: RADIOLOGY | Facility: HOSPITAL | Age: 70
DRG: 565 | End: 2025-05-15
Payer: COMMERCIAL

## 2025-05-15 ENCOUNTER — APPOINTMENT (OUTPATIENT)
Dept: CARDIOLOGY | Facility: HOSPITAL | Age: 70
DRG: 565 | End: 2025-05-15
Payer: COMMERCIAL

## 2025-05-15 ENCOUNTER — NURSING HOME VISIT (OUTPATIENT)
Dept: POST ACUTE CARE | Facility: EXTERNAL LOCATION | Age: 70
End: 2025-05-15

## 2025-05-15 ENCOUNTER — HOSPITAL ENCOUNTER (INPATIENT)
Facility: HOSPITAL | Age: 70
LOS: 1 days | Discharge: NURSING, REHAB OR LONG-TERM CARE | DRG: 565 | End: 2025-05-17
Attending: EMERGENCY MEDICINE | Admitting: INTERNAL MEDICINE
Payer: COMMERCIAL
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
## 2025-05-15 DIAGNOSIS — F20.89 OTHER SCHIZOPHRENIA: ICD-10-CM

## 2025-05-15 DIAGNOSIS — T79.6XXA TRAUMATIC RHABDOMYOLYSIS, INITIAL ENCOUNTER: Primary | ICD-10-CM

## 2025-05-15 DIAGNOSIS — G81.11 SPASTIC HEMIPLEGIA OF RIGHT DOMINANT SIDE DUE TO NONCEREBROVASCULAR ETIOLOGY (MULTI): ICD-10-CM

## 2025-05-15 DIAGNOSIS — S09.90XA CLOSED HEAD INJURY, INITIAL ENCOUNTER: ICD-10-CM

## 2025-05-15 DIAGNOSIS — G89.29 OTHER CHRONIC PAIN: ICD-10-CM

## 2025-05-15 DIAGNOSIS — I10 PRIMARY HYPERTENSION: Primary | ICD-10-CM

## 2025-05-15 DIAGNOSIS — R29.6 UNWITNESSED FALL: ICD-10-CM

## 2025-05-15 PROBLEM — M54.6 ACUTE MIDLINE THORACIC BACK PAIN: Status: ACTIVE | Noted: 2025-05-15

## 2025-05-15 PROBLEM — M62.82 RHABDOMYOLYSIS: Status: ACTIVE | Noted: 2025-05-15

## 2025-05-15 PROBLEM — M79.601 RIGHT ARM PAIN: Status: ACTIVE | Noted: 2025-05-15

## 2025-05-15 LAB
ALBUMIN SERPL BCP-MCNC: 3.3 G/DL (ref 3.4–5)
ALP SERPL-CCNC: 52 U/L (ref 33–136)
ALT SERPL W P-5'-P-CCNC: 12 U/L (ref 7–45)
AMPHETAMINES UR QL SCN: NORMAL
ANION GAP SERPL CALC-SCNC: 8 MMOL/L (ref 10–20)
APAP SERPL-MCNC: <10 UG/ML (ref ?–30)
APPEARANCE UR: CLEAR
AST SERPL W P-5'-P-CCNC: 54 U/L (ref 9–39)
BARBITURATES UR QL SCN: NORMAL
BASOPHILS # BLD AUTO: 0.02 X10*3/UL (ref 0–0.1)
BASOPHILS NFR BLD AUTO: 0.2 %
BENZODIAZ UR QL SCN: NORMAL
BILIRUB SERPL-MCNC: 0.3 MG/DL (ref 0–1.2)
BILIRUB UR STRIP.AUTO-MCNC: NEGATIVE MG/DL
BUN SERPL-MCNC: 22 MG/DL (ref 6–23)
BZE UR QL SCN: NORMAL
CALCIUM SERPL-MCNC: 9.2 MG/DL (ref 8.6–10.3)
CANNABINOIDS UR QL SCN: NORMAL
CHLORIDE SERPL-SCNC: 103 MMOL/L (ref 98–107)
CK SERPL-CCNC: 2173 U/L (ref 0–215)
CK SERPL-CCNC: 2912 U/L (ref 0–215)
CO2 SERPL-SCNC: 30 MMOL/L (ref 21–32)
COLOR UR: NORMAL
CREAT SERPL-MCNC: 0.46 MG/DL (ref 0.5–1.05)
EGFRCR SERPLBLD CKD-EPI 2021: >90 ML/MIN/1.73M*2
EOSINOPHIL # BLD AUTO: 0.06 X10*3/UL (ref 0–0.7)
EOSINOPHIL NFR BLD AUTO: 0.7 %
ERYTHROCYTE [DISTWIDTH] IN BLOOD BY AUTOMATED COUNT: 13.6 % (ref 11.5–14.5)
ETHANOL SERPL-MCNC: <10 MG/DL
FENTANYL+NORFENTANYL UR QL SCN: NORMAL
GLUCOSE SERPL-MCNC: 86 MG/DL (ref 74–99)
GLUCOSE UR STRIP.AUTO-MCNC: NORMAL MG/DL
HCT VFR BLD AUTO: 32.3 % (ref 36–46)
HGB BLD-MCNC: 10.5 G/DL (ref 12–16)
IMM GRANULOCYTES # BLD AUTO: 0.02 X10*3/UL (ref 0–0.7)
IMM GRANULOCYTES NFR BLD AUTO: 0.2 % (ref 0–0.9)
KETONES UR STRIP.AUTO-MCNC: NEGATIVE MG/DL
LEUKOCYTE ESTERASE UR QL STRIP.AUTO: NEGATIVE
LYMPHOCYTES # BLD AUTO: 1.74 X10*3/UL (ref 1.2–4.8)
LYMPHOCYTES NFR BLD AUTO: 20.7 %
MCH RBC QN AUTO: 30.4 PG (ref 26–34)
MCHC RBC AUTO-ENTMCNC: 32.5 G/DL (ref 32–36)
MCV RBC AUTO: 94 FL (ref 80–100)
METHADONE UR QL SCN: NORMAL
MONOCYTES # BLD AUTO: 1.05 X10*3/UL (ref 0.1–1)
MONOCYTES NFR BLD AUTO: 12.5 %
NEUTROPHILS # BLD AUTO: 5.52 X10*3/UL (ref 1.2–7.7)
NEUTROPHILS NFR BLD AUTO: 65.7 %
NITRITE UR QL STRIP.AUTO: NEGATIVE
NRBC BLD-RTO: 0 /100 WBCS (ref 0–0)
OPIATES UR QL SCN: NORMAL
OXYCODONE+OXYMORPHONE UR QL SCN: NORMAL
PCP UR QL SCN: NORMAL
PH UR STRIP.AUTO: 6.5 [PH]
PLATELET # BLD AUTO: 205 X10*3/UL (ref 150–450)
POTASSIUM SERPL-SCNC: 3.7 MMOL/L (ref 3.5–5.3)
PROT SERPL-MCNC: 6.7 G/DL (ref 6.4–8.2)
PROT UR STRIP.AUTO-MCNC: NEGATIVE MG/DL
RBC # BLD AUTO: 3.45 X10*6/UL (ref 4–5.2)
RBC # UR STRIP.AUTO: NEGATIVE MG/DL
SALICYLATES SERPL-MCNC: <3 MG/DL (ref ?–20)
SODIUM SERPL-SCNC: 137 MMOL/L (ref 136–145)
SP GR UR STRIP.AUTO: 1.02
UROBILINOGEN UR STRIP.AUTO-MCNC: NORMAL MG/DL
WBC # BLD AUTO: 8.4 X10*3/UL (ref 4.4–11.3)

## 2025-05-15 PROCEDURE — 72100 X-RAY EXAM L-S SPINE 2/3 VWS: CPT | Mod: FY

## 2025-05-15 PROCEDURE — 71045 X-RAY EXAM CHEST 1 VIEW: CPT | Performed by: RADIOLOGY

## 2025-05-15 PROCEDURE — 99309 SBSQ NF CARE MODERATE MDM 30: CPT | Performed by: STUDENT IN AN ORGANIZED HEALTH CARE EDUCATION/TRAINING PROGRAM

## 2025-05-15 PROCEDURE — 82550 ASSAY OF CK (CPK): CPT | Performed by: NURSE PRACTITIONER

## 2025-05-15 PROCEDURE — 2500000004 HC RX 250 GENERAL PHARMACY W/ HCPCS (ALT 636 FOR OP/ED): Mod: JZ | Performed by: NURSE PRACTITIONER

## 2025-05-15 PROCEDURE — 85025 COMPLETE CBC W/AUTO DIFF WBC: CPT | Performed by: EMERGENCY MEDICINE

## 2025-05-15 PROCEDURE — 99285 EMERGENCY DEPT VISIT HI MDM: CPT | Performed by: EMERGENCY MEDICINE

## 2025-05-15 PROCEDURE — 71045 X-RAY EXAM CHEST 1 VIEW: CPT

## 2025-05-15 PROCEDURE — 80053 COMPREHEN METABOLIC PANEL: CPT | Performed by: EMERGENCY MEDICINE

## 2025-05-15 PROCEDURE — 86318 IA INFECTIOUS AGENT ANTIBODY: CPT | Mod: STJLAB | Performed by: EMERGENCY MEDICINE

## 2025-05-15 PROCEDURE — 73090 X-RAY EXAM OF FOREARM: CPT | Mod: RIGHT SIDE | Performed by: RADIOLOGY

## 2025-05-15 PROCEDURE — 2500000001 HC RX 250 WO HCPCS SELF ADMINISTERED DRUGS (ALT 637 FOR MEDICARE OP): Performed by: NURSE PRACTITIONER

## 2025-05-15 PROCEDURE — 96361 HYDRATE IV INFUSION ADD-ON: CPT

## 2025-05-15 PROCEDURE — 72125 CT NECK SPINE W/O DYE: CPT | Performed by: RADIOLOGY

## 2025-05-15 PROCEDURE — 72100 X-RAY EXAM L-S SPINE 2/3 VWS: CPT | Mod: COMPUTED RADIOGRAPHY X-RAY | Performed by: RADIOLOGY

## 2025-05-15 PROCEDURE — 99285 EMERGENCY DEPT VISIT HI MDM: CPT | Mod: 25 | Performed by: EMERGENCY MEDICINE

## 2025-05-15 PROCEDURE — 2500000004 HC RX 250 GENERAL PHARMACY W/ HCPCS (ALT 636 FOR OP/ED): Mod: JZ | Performed by: EMERGENCY MEDICINE

## 2025-05-15 PROCEDURE — G0378 HOSPITAL OBSERVATION PER HR: HCPCS

## 2025-05-15 PROCEDURE — 86780 TREPONEMA PALLIDUM: CPT | Mod: STJLAB | Performed by: EMERGENCY MEDICINE

## 2025-05-15 PROCEDURE — 36415 COLL VENOUS BLD VENIPUNCTURE: CPT | Performed by: NURSE PRACTITIONER

## 2025-05-15 PROCEDURE — 72125 CT NECK SPINE W/O DYE: CPT

## 2025-05-15 PROCEDURE — 96360 HYDRATION IV INFUSION INIT: CPT

## 2025-05-15 PROCEDURE — 70450 CT HEAD/BRAIN W/O DYE: CPT | Performed by: RADIOLOGY

## 2025-05-15 PROCEDURE — 73090 X-RAY EXAM OF FOREARM: CPT | Mod: RT

## 2025-05-15 PROCEDURE — 93005 ELECTROCARDIOGRAM TRACING: CPT

## 2025-05-15 PROCEDURE — 73070 X-RAY EXAM OF ELBOW: CPT | Mod: RT

## 2025-05-15 PROCEDURE — 70450 CT HEAD/BRAIN W/O DYE: CPT

## 2025-05-15 PROCEDURE — 80307 DRUG TEST PRSMV CHEM ANLYZR: CPT | Performed by: EMERGENCY MEDICINE

## 2025-05-15 PROCEDURE — 73060 X-RAY EXAM OF HUMERUS: CPT | Mod: RT

## 2025-05-15 PROCEDURE — 36415 COLL VENOUS BLD VENIPUNCTURE: CPT | Performed by: EMERGENCY MEDICINE

## 2025-05-15 PROCEDURE — 82550 ASSAY OF CK (CPK): CPT | Performed by: EMERGENCY MEDICINE

## 2025-05-15 PROCEDURE — 2500000001 HC RX 250 WO HCPCS SELF ADMINISTERED DRUGS (ALT 637 FOR MEDICARE OP): Performed by: INTERNAL MEDICINE

## 2025-05-15 PROCEDURE — 81003 URINALYSIS AUTO W/O SCOPE: CPT | Performed by: EMERGENCY MEDICINE

## 2025-05-15 PROCEDURE — 73030 X-RAY EXAM OF SHOULDER: CPT | Mod: RT

## 2025-05-15 PROCEDURE — 73030 X-RAY EXAM OF SHOULDER: CPT | Mod: RIGHT SIDE | Performed by: RADIOLOGY

## 2025-05-15 PROCEDURE — 73060 X-RAY EXAM OF HUMERUS: CPT | Mod: RIGHT SIDE | Performed by: RADIOLOGY

## 2025-05-15 PROCEDURE — 73070 X-RAY EXAM OF ELBOW: CPT | Mod: RIGHT SIDE | Performed by: RADIOLOGY

## 2025-05-15 PROCEDURE — 80320 DRUG SCREEN QUANTALCOHOLS: CPT | Performed by: EMERGENCY MEDICINE

## 2025-05-15 RX ORDER — MIRTAZAPINE 7.5 MG/1
7.5 TABLET, FILM COATED ORAL NIGHTLY
COMMUNITY

## 2025-05-15 RX ORDER — ACETAMINOPHEN 160 MG/5ML
650 SOLUTION ORAL EVERY 4 HOURS PRN
Status: DISCONTINUED | OUTPATIENT
Start: 2025-05-15 | End: 2025-05-17 | Stop reason: HOSPADM

## 2025-05-15 RX ORDER — ASPIRIN 81 MG/1
81 TABLET ORAL DAILY
COMMUNITY

## 2025-05-15 RX ORDER — TIMOLOL MALEATE 5 MG/ML
1 SOLUTION/ DROPS OPHTHALMIC 2 TIMES DAILY
Status: DISCONTINUED | OUTPATIENT
Start: 2025-05-15 | End: 2025-05-17 | Stop reason: HOSPADM

## 2025-05-15 RX ORDER — SODIUM CHLORIDE 9 MG/ML
100 INJECTION, SOLUTION INTRAVENOUS CONTINUOUS
Status: ACTIVE | OUTPATIENT
Start: 2025-05-15 | End: 2025-05-16

## 2025-05-15 RX ORDER — TRAZODONE HYDROCHLORIDE 50 MG/1
50 TABLET ORAL NIGHTLY PRN
Status: DISCONTINUED | OUTPATIENT
Start: 2025-05-15 | End: 2025-05-17 | Stop reason: HOSPADM

## 2025-05-15 RX ORDER — TRAZODONE HYDROCHLORIDE 50 MG/1
50 TABLET ORAL NIGHTLY PRN
COMMUNITY
Start: 2025-05-13 | End: 2025-05-27

## 2025-05-15 RX ORDER — MELOXICAM 7.5 MG/1
7.5 TABLET ORAL DAILY
Status: DISCONTINUED | OUTPATIENT
Start: 2025-05-16 | End: 2025-05-17 | Stop reason: HOSPADM

## 2025-05-15 RX ORDER — DULOXETIN HYDROCHLORIDE 20 MG/1
40 CAPSULE, DELAYED RELEASE ORAL DAILY
Status: DISCONTINUED | OUTPATIENT
Start: 2025-05-15 | End: 2025-05-17 | Stop reason: HOSPADM

## 2025-05-15 RX ORDER — HYDROXYZINE HYDROCHLORIDE 50 MG/1
50 TABLET, FILM COATED ORAL NIGHTLY
Status: DISCONTINUED | OUTPATIENT
Start: 2025-05-15 | End: 2025-05-17 | Stop reason: HOSPADM

## 2025-05-15 RX ORDER — MELOXICAM 7.5 MG/1
7.5 TABLET ORAL DAILY
COMMUNITY

## 2025-05-15 RX ORDER — BRIMONIDINE TARTRATE AND TIMOLOL MALEATE 2; 5 MG/ML; MG/ML
1 SOLUTION OPHTHALMIC 2 TIMES DAILY
Status: DISCONTINUED | OUTPATIENT
Start: 2025-05-15 | End: 2025-05-15

## 2025-05-15 RX ORDER — BRIMONIDINE TARTRATE 2 MG/ML
1 SOLUTION/ DROPS OPHTHALMIC 2 TIMES DAILY
Status: DISCONTINUED | OUTPATIENT
Start: 2025-05-15 | End: 2025-05-17 | Stop reason: HOSPADM

## 2025-05-15 RX ORDER — BRIMONIDINE TARTRATE AND TIMOLOL MALEATE 2; 5 MG/ML; MG/ML
1 SOLUTION OPHTHALMIC 2 TIMES DAILY
COMMUNITY

## 2025-05-15 RX ORDER — POLYETHYLENE GLYCOL 3350 17 G/17G
17 POWDER, FOR SOLUTION ORAL DAILY
Status: DISCONTINUED | OUTPATIENT
Start: 2025-05-15 | End: 2025-05-17 | Stop reason: HOSPADM

## 2025-05-15 RX ORDER — VIT C/E/ZN/COPPR/LUTEIN/ZEAXAN 250MG-90MG
25 CAPSULE ORAL DAILY
COMMUNITY

## 2025-05-15 RX ORDER — DIVALPROEX SODIUM 250 MG/1
250 TABLET, DELAYED RELEASE ORAL 3 TIMES DAILY
Status: DISCONTINUED | OUTPATIENT
Start: 2025-05-15 | End: 2025-05-17 | Stop reason: HOSPADM

## 2025-05-15 RX ORDER — OXYCODONE HYDROCHLORIDE 5 MG/1
2.5 TABLET ORAL ONCE
Refills: 0 | Status: COMPLETED | OUTPATIENT
Start: 2025-05-15 | End: 2025-05-15

## 2025-05-15 RX ORDER — ACETAMINOPHEN 650 MG/1
650 SUPPOSITORY RECTAL EVERY 4 HOURS PRN
Status: DISCONTINUED | OUTPATIENT
Start: 2025-05-15 | End: 2025-05-17 | Stop reason: HOSPADM

## 2025-05-15 RX ORDER — ACETAMINOPHEN 325 MG/1
650 TABLET ORAL EVERY 4 HOURS PRN
Status: DISCONTINUED | OUTPATIENT
Start: 2025-05-15 | End: 2025-05-17 | Stop reason: HOSPADM

## 2025-05-15 RX ORDER — HYDROXYZINE HYDROCHLORIDE 50 MG/1
50 TABLET, FILM COATED ORAL NIGHTLY
COMMUNITY

## 2025-05-15 RX ORDER — ASPIRIN 81 MG/1
81 TABLET ORAL DAILY
Status: DISCONTINUED | OUTPATIENT
Start: 2025-05-16 | End: 2025-05-17 | Stop reason: HOSPADM

## 2025-05-15 RX ORDER — LANOLIN ALCOHOL/MO/W.PET/CERES
1000 CREAM (GRAM) TOPICAL DAILY
COMMUNITY

## 2025-05-15 RX ORDER — DIVALPROEX SODIUM 250 MG/1
250 TABLET, DELAYED RELEASE ORAL 3 TIMES DAILY
COMMUNITY

## 2025-05-15 RX ORDER — ATORVASTATIN CALCIUM 80 MG/1
80 TABLET, FILM COATED ORAL NIGHTLY
COMMUNITY

## 2025-05-15 RX ORDER — ATORVASTATIN CALCIUM 80 MG/1
80 TABLET, FILM COATED ORAL NIGHTLY
Status: DISCONTINUED | OUTPATIENT
Start: 2025-05-15 | End: 2025-05-17 | Stop reason: HOSPADM

## 2025-05-15 RX ORDER — MIRTAZAPINE 15 MG/1
7.5 TABLET, FILM COATED ORAL NIGHTLY
Status: DISCONTINUED | OUTPATIENT
Start: 2025-05-15 | End: 2025-05-17 | Stop reason: HOSPADM

## 2025-05-15 RX ORDER — ACETAMINOPHEN 325 MG/1
975 TABLET ORAL ONCE
Status: DISCONTINUED | OUTPATIENT
Start: 2025-05-15 | End: 2025-05-17 | Stop reason: HOSPADM

## 2025-05-15 RX ADMIN — OXYCODONE HYDROCHLORIDE 2.5 MG: 5 TABLET ORAL at 18:04

## 2025-05-15 RX ADMIN — BRIMONIDINE TARTRATE 1 DROP: 2 SOLUTION/ DROPS OPHTHALMIC at 20:11

## 2025-05-15 RX ADMIN — SODIUM CHLORIDE 1000 ML: 0.9 INJECTION, SOLUTION INTRAVENOUS at 15:42

## 2025-05-15 RX ADMIN — SODIUM CHLORIDE 100 ML/HR: 9 INJECTION, SOLUTION INTRAVENOUS at 19:03

## 2025-05-15 RX ADMIN — DIVALPROEX SODIUM 250 MG: 250 TABLET, DELAYED RELEASE ORAL at 19:03

## 2025-05-15 RX ADMIN — DIVALPROEX SODIUM 250 MG: 250 TABLET, DELAYED RELEASE ORAL at 22:04

## 2025-05-15 RX ADMIN — DULOXETINE HYDROCHLORIDE 40 MG: 20 CAPSULE, DELAYED RELEASE ORAL at 19:02

## 2025-05-15 SDOH — SOCIAL STABILITY: SOCIAL INSECURITY: HAS ANYONE EVER THREATENED TO HURT YOUR FAMILY OR YOUR PETS?: NO

## 2025-05-15 SDOH — SOCIAL STABILITY: SOCIAL INSECURITY
WITHIN THE LAST YEAR, HAVE YOU BEEN KICKED, HIT, SLAPPED, OR OTHERWISE PHYSICALLY HURT BY YOUR PARTNER OR EX-PARTNER?: NO

## 2025-05-15 SDOH — ECONOMIC STABILITY: FOOD INSECURITY: WITHIN THE PAST 12 MONTHS, YOU WORRIED THAT YOUR FOOD WOULD RUN OUT BEFORE YOU GOT THE MONEY TO BUY MORE.: NEVER TRUE

## 2025-05-15 SDOH — SOCIAL STABILITY: SOCIAL INSECURITY: ARE THERE ANY APPARENT SIGNS OF INJURIES/BEHAVIORS THAT COULD BE RELATED TO ABUSE/NEGLECT?: NO

## 2025-05-15 SDOH — SOCIAL STABILITY: SOCIAL INSECURITY: WITHIN THE LAST YEAR, HAVE YOU BEEN HUMILIATED OR EMOTIONALLY ABUSED IN OTHER WAYS BY YOUR PARTNER OR EX-PARTNER?: NO

## 2025-05-15 SDOH — SOCIAL STABILITY: SOCIAL INSECURITY: WITHIN THE LAST YEAR, HAVE YOU BEEN AFRAID OF YOUR PARTNER OR EX-PARTNER?: NO

## 2025-05-15 SDOH — SOCIAL STABILITY: SOCIAL INSECURITY: DO YOU FEEL UNSAFE GOING BACK TO THE PLACE WHERE YOU ARE LIVING?: NO

## 2025-05-15 SDOH — ECONOMIC STABILITY: HOUSING INSECURITY: IN THE LAST 12 MONTHS, WAS THERE A TIME WHEN YOU WERE NOT ABLE TO PAY THE MORTGAGE OR RENT ON TIME?: NO

## 2025-05-15 SDOH — ECONOMIC STABILITY: FOOD INSECURITY: WITHIN THE PAST 12 MONTHS, THE FOOD YOU BOUGHT JUST DIDN'T LAST AND YOU DIDN'T HAVE MONEY TO GET MORE.: NEVER TRUE

## 2025-05-15 SDOH — ECONOMIC STABILITY: HOUSING INSECURITY: AT ANY TIME IN THE PAST 12 MONTHS, WERE YOU HOMELESS OR LIVING IN A SHELTER (INCLUDING NOW)?: NO

## 2025-05-15 SDOH — SOCIAL STABILITY: SOCIAL INSECURITY
WITHIN THE LAST YEAR, HAVE YOU BEEN RAPED OR FORCED TO HAVE ANY KIND OF SEXUAL ACTIVITY BY YOUR PARTNER OR EX-PARTNER?: NO

## 2025-05-15 SDOH — ECONOMIC STABILITY: INCOME INSECURITY: IN THE PAST 12 MONTHS HAS THE ELECTRIC, GAS, OIL, OR WATER COMPANY THREATENED TO SHUT OFF SERVICES IN YOUR HOME?: NO

## 2025-05-15 SDOH — SOCIAL STABILITY: SOCIAL INSECURITY: ARE YOU OR HAVE YOU BEEN THREATENED OR ABUSED PHYSICALLY, EMOTIONALLY, OR SEXUALLY BY ANYONE?: NO

## 2025-05-15 SDOH — ECONOMIC STABILITY: FOOD INSECURITY: HOW HARD IS IT FOR YOU TO PAY FOR THE VERY BASICS LIKE FOOD, HOUSING, MEDICAL CARE, AND HEATING?: NOT VERY HARD

## 2025-05-15 SDOH — SOCIAL STABILITY: SOCIAL INSECURITY: ABUSE: ADULT

## 2025-05-15 SDOH — ECONOMIC STABILITY: HOUSING INSECURITY: IN THE PAST 12 MONTHS, HOW MANY TIMES HAVE YOU MOVED WHERE YOU WERE LIVING?: 0

## 2025-05-15 SDOH — SOCIAL STABILITY: SOCIAL INSECURITY: DO YOU FEEL ANYONE HAS EXPLOITED OR TAKEN ADVANTAGE OF YOU FINANCIALLY OR OF YOUR PERSONAL PROPERTY?: NO

## 2025-05-15 SDOH — SOCIAL STABILITY: SOCIAL INSECURITY: HAVE YOU HAD ANY THOUGHTS OF HARMING ANYONE ELSE?: NO

## 2025-05-15 SDOH — SOCIAL STABILITY: SOCIAL INSECURITY: DOES ANYONE TRY TO KEEP YOU FROM HAVING/CONTACTING OTHER FRIENDS OR DOING THINGS OUTSIDE YOUR HOME?: NO

## 2025-05-15 SDOH — SOCIAL STABILITY: SOCIAL INSECURITY: HAVE YOU HAD THOUGHTS OF HARMING ANYONE ELSE?: NO

## 2025-05-15 SDOH — ECONOMIC STABILITY: TRANSPORTATION INSECURITY: IN THE PAST 12 MONTHS, HAS LACK OF TRANSPORTATION KEPT YOU FROM MEDICAL APPOINTMENTS OR FROM GETTING MEDICATIONS?: NO

## 2025-05-15 ASSESSMENT — COGNITIVE AND FUNCTIONAL STATUS - GENERAL
MOVING TO AND FROM BED TO CHAIR: A LITTLE
CLIMB 3 TO 5 STEPS WITH RAILING: A LOT
PATIENT BASELINE BEDBOUND: NO
STANDING UP FROM CHAIR USING ARMS: A LITTLE
TOILETING: A LITTLE
TOILETING: A LITTLE
MOBILITY SCORE: 18
WALKING IN HOSPITAL ROOM: A LITTLE
HELP NEEDED FOR BATHING: A LITTLE
CLIMB 3 TO 5 STEPS WITH RAILING: A LOT
TURNING FROM BACK TO SIDE WHILE IN FLAT BAD: A LITTLE
STANDING UP FROM CHAIR USING ARMS: A LITTLE
MOVING TO AND FROM BED TO CHAIR: A LITTLE
DAILY ACTIVITIY SCORE: 20
HELP NEEDED FOR BATHING: A LITTLE
MOBILITY SCORE: 18
DRESSING REGULAR UPPER BODY CLOTHING: A LITTLE
DAILY ACTIVITIY SCORE: 20
DRESSING REGULAR LOWER BODY CLOTHING: A LITTLE
WALKING IN HOSPITAL ROOM: A LITTLE
TURNING FROM BACK TO SIDE WHILE IN FLAT BAD: A LITTLE
DRESSING REGULAR LOWER BODY CLOTHING: A LITTLE
DRESSING REGULAR UPPER BODY CLOTHING: A LITTLE

## 2025-05-15 ASSESSMENT — ACTIVITIES OF DAILY LIVING (ADL)
GROOMING: NEEDS ASSISTANCE
PATIENT'S MEMORY ADEQUATE TO SAFELY COMPLETE DAILY ACTIVITIES?: YES
TOILETING: NEEDS ASSISTANCE
FEEDING YOURSELF: INDEPENDENT
ADEQUATE_TO_COMPLETE_ADL: YES
HEARING - RIGHT EAR: FUNCTIONAL
ASSISTIVE_DEVICE: WALKER
LACK_OF_TRANSPORTATION: NO
WALKS IN HOME: NEEDS ASSISTANCE
DRESSING YOURSELF: NEEDS ASSISTANCE
BATHING: NEEDS ASSISTANCE
JUDGMENT_ADEQUATE_SAFELY_COMPLETE_DAILY_ACTIVITIES: YES
HEARING - LEFT EAR: FUNCTIONAL

## 2025-05-15 ASSESSMENT — ENCOUNTER SYMPTOMS
COUGH: 0
FEVER: 0
AGITATION: 0
DIARRHEA: 0
COLOR CHANGE: 0
PALPITATIONS: 0
SORE THROAT: 0
TROUBLE SWALLOWING: 0
CONSTIPATION: 0
CHILLS: 0
NECK PAIN: 0
BACK PAIN: 1
ARTHRALGIAS: 1
ABDOMINAL PAIN: 0
CONFUSION: 0
DIFFICULTY URINATING: 0
DIZZINESS: 0
NECK STIFFNESS: 0
DYSURIA: 0
SHORTNESS OF BREATH: 0
HEADACHES: 0
WEAKNESS: 1

## 2025-05-15 ASSESSMENT — LIFESTYLE VARIABLES
SKIP TO QUESTIONS 9-10: 1
AUDIT-C TOTAL SCORE: 0
EVER HAD A DRINK FIRST THING IN THE MORNING TO STEADY YOUR NERVES TO GET RID OF A HANGOVER: NO
EVER FELT BAD OR GUILTY ABOUT YOUR DRINKING: NO
HOW OFTEN DO YOU HAVE A DRINK CONTAINING ALCOHOL: NEVER
HAVE YOU EVER FELT YOU SHOULD CUT DOWN ON YOUR DRINKING: NO
HOW MANY STANDARD DRINKS CONTAINING ALCOHOL DO YOU HAVE ON A TYPICAL DAY: PATIENT DOES NOT DRINK
HOW OFTEN DO YOU HAVE 6 OR MORE DRINKS ON ONE OCCASION: NEVER
AUDIT-C TOTAL SCORE: 0
TOTAL SCORE: 0
HAVE PEOPLE ANNOYED YOU BY CRITICIZING YOUR DRINKING: NO

## 2025-05-15 ASSESSMENT — COLUMBIA-SUICIDE SEVERITY RATING SCALE - C-SSRS
6. HAVE YOU EVER DONE ANYTHING, STARTED TO DO ANYTHING, OR PREPARED TO DO ANYTHING TO END YOUR LIFE?: NO
2. HAVE YOU ACTUALLY HAD ANY THOUGHTS OF KILLING YOURSELF?: NO
1. IN THE PAST MONTH, HAVE YOU WISHED YOU WERE DEAD OR WISHED YOU COULD GO TO SLEEP AND NOT WAKE UP?: NO
2. HAVE YOU ACTUALLY HAD ANY THOUGHTS OF KILLING YOURSELF?: NO
6. HAVE YOU EVER DONE ANYTHING, STARTED TO DO ANYTHING, OR PREPARED TO DO ANYTHING TO END YOUR LIFE?: NO
1. IN THE PAST MONTH, HAVE YOU WISHED YOU WERE DEAD OR WISHED YOU COULD GO TO SLEEP AND NOT WAKE UP?: NO

## 2025-05-15 ASSESSMENT — PAIN SCALES - GENERAL
PAINLEVEL_OUTOF10: 0 - NO PAIN
PAINLEVEL_OUTOF10: 5 - MODERATE PAIN

## 2025-05-15 ASSESSMENT — PAIN DESCRIPTION - DESCRIPTORS: DESCRIPTORS: ACHING

## 2025-05-15 ASSESSMENT — PAIN - FUNCTIONAL ASSESSMENT: PAIN_FUNCTIONAL_ASSESSMENT: 0-10

## 2025-05-15 ASSESSMENT — PAIN DESCRIPTION - LOCATION: LOCATION: SHOULDER

## 2025-05-15 ASSESSMENT — PATIENT HEALTH QUESTIONNAIRE - PHQ9
1. LITTLE INTEREST OR PLEASURE IN DOING THINGS: NOT AT ALL
2. FEELING DOWN, DEPRESSED OR HOPELESS: NOT AT ALL
SUM OF ALL RESPONSES TO PHQ9 QUESTIONS 1 & 2: 0

## 2025-05-15 ASSESSMENT — PAIN DESCRIPTION - ORIENTATION: ORIENTATION: RIGHT

## 2025-05-15 ASSESSMENT — PAIN DESCRIPTION - PAIN TYPE: TYPE: ACUTE PAIN

## 2025-05-15 NOTE — ED PROVIDER NOTES
EMERGENCY DEPARTMENT ENCOUNTER      Pt Name: Cristel Boyd  MRN: [id number]  Birthdate 1955  Date of evaluation: 5/15/2025  Provider: Dale Turner DO    CHIEF COMPLAINT       Chief Complaint   Patient presents with    Fall     Pt slipped to the floor last night states her socks were loose which caused her to slip and fall. A staff member was opening the door to check on her and accidentally hit her in the head with the door. Pt states she landed on her face but denies LOC, no thinners. Pt has a small hematoma on forehead and states only pain is in her R shoulder.      HISTORY OF PRESENT ILLNESS    Cristel Boyd is a 69 y.o. year old female who presents to the ER for fall.  Last night slipped on her socks and fell on the floor, did not faint or hit her head when she landed however her head was struck by the door when the nursing home staff opened it up.  She is primarily reporting pain in her right arm, does report slight headache as well.  Denies chest pain, nausea, vomit, shortness of breath, abdominal pain, urine or stool changes.  Reports that she was on the ground for about 2 or 3 hours.    PMH dmentia, schizoaffective, bilpolar, schizophrenia, drug induced parkinsonism, HLD, latent syphillis, reduced mobility, repeated falls, vitamin D deficiency, chronic hepatitis, glaucoma, anxiety, depression, opioid abuse       PAST MEDICAL HISTORY   Medical History[1]  CURRENT MEDICATIONS       Previous Medications    No medications on file     SURGICAL HISTORY     Surgical History[2]  ALLERGIES     Lidocaine  FAMILY HISTORY     Family History[3]  SOCIAL HISTORY     Social History[4]  PHYSICAL EXAM  (up to 7 for level 4, 8 or more for level 5)     ED Triage Vitals [05/15/25 1233]   Temperature Heart Rate Respirations BP   36.6 °C (97.9 °F) 78 20 105/68      Pulse Ox Temp Source Heart Rate Source Patient Position   97 % Temporal Monitor Sitting      BP Location FiO2 (%)     Left arm --       Physical  Exam  Vitals and nursing note reviewed.   Constitutional:       General: She is not in acute distress.     Appearance: Normal appearance. She is not ill-appearing, toxic-appearing or diaphoretic.   HENT:      Head: Normocephalic and atraumatic. No raccoon eyes, Ravi's sign, contusion or laceration.      Jaw: No trismus or malocclusion.      Right Ear: External ear normal.      Left Ear: External ear normal.      Mouth/Throat:      Mouth: Mucous membranes are moist.      Pharynx: Oropharynx is clear.   Eyes:      Extraocular Movements: Extraocular movements intact.      Pupils: Pupils are equal, round, and reactive to light.   Neck:      Trachea: No tracheal deviation.   Cardiovascular:      Rate and Rhythm: Normal rate and regular rhythm.      Pulses: Normal pulses.           Radial pulses are 2+ on the right side and 2+ on the left side.        Dorsalis pedis pulses are 2+ on the right side and 2+ on the left side.   Pulmonary:      Effort: Pulmonary effort is normal. No respiratory distress.      Breath sounds: No stridor. No wheezing, rhonchi or rales.   Chest:      Chest wall: Tenderness (Right-sided) present.   Abdominal:      General: Abdomen is flat.      Palpations: Abdomen is soft. There is no mass.      Tenderness: There is no abdominal tenderness. There is no guarding or rebound.   Musculoskeletal:         General: No signs of injury.      Right shoulder: Tenderness present. No deformity. Decreased range of motion.      Left shoulder: No deformity or tenderness. Normal range of motion.      Right upper arm: Tenderness present. No deformity.      Left upper arm: No deformity or tenderness.      Right elbow: No deformity. Decreased range of motion. Tenderness present.      Left elbow: No deformity. Normal range of motion. No tenderness.      Right forearm: Tenderness present. No deformity.      Left forearm: No deformity or tenderness.      Right wrist: No deformity, tenderness or snuff box tenderness.  Normal range of motion.      Left wrist: No deformity, tenderness or snuff box tenderness. Normal range of motion.      Right hand: No tenderness. Normal range of motion.      Left hand: No tenderness. Normal range of motion.      Cervical back: Neck supple. Tenderness present. No deformity.      Thoracic back: No deformity or tenderness.      Lumbar back: No deformity or tenderness.      Right hip: No deformity or tenderness. Normal range of motion.      Left hip: No deformity or tenderness. Normal range of motion.      Right upper leg: No deformity or tenderness.      Left upper leg: No deformity or tenderness.      Right knee: No deformity. Normal range of motion. No tenderness.      Left knee: No deformity. Normal range of motion. No tenderness.      Right lower leg: No deformity or tenderness. No edema.      Left lower leg: No deformity or tenderness. No edema.      Right ankle: No deformity. No tenderness.      Left ankle: No deformity. No tenderness.      Right foot: Normal range of motion. No deformity or tenderness.      Left foot: Normal range of motion. No deformity or tenderness.   Skin:     General: Skin is warm and dry.      Capillary Refill: Capillary refill takes less than 2 seconds.      Coloration: Skin is not jaundiced or pale.      Findings: No bruising, lesion, petechiae, rash or wound.   Neurological:      General: No focal deficit present.      Mental Status: She is alert.      Cranial Nerves: No cranial nerve deficit.      Sensory: No sensory deficit.      Motor: No weakness.      Coordination: Coordination normal.        DIAGNOSTIC RESULTS   LABS:  Labs Reviewed   COMPREHENSIVE METABOLIC PANEL - Abnormal       Result Value    Glucose 86      Sodium 137      Potassium 3.7      Chloride 103      Bicarbonate 30      Anion Gap 8 (*)     Urea Nitrogen 22      Creatinine 0.46 (*)     eGFR >90      Calcium 9.2      Albumin 3.3 (*)     Alkaline Phosphatase 52      Total Protein 6.7      AST 54 (*)      Bilirubin, Total 0.3      ALT 12     CREATINE KINASE - Abnormal    Creatine Kinase 2,912 (*)    CBC WITH AUTO DIFFERENTIAL - Abnormal    WBC 8.4      nRBC 0.0      RBC 3.45 (*)     Hemoglobin 10.5 (*)     Hematocrit 32.3 (*)     MCV 94      MCH 30.4      MCHC 32.5      RDW 13.6      Platelets 205      Neutrophils % 65.7      Immature Granulocytes %, Automated 0.2      Lymphocytes % 20.7      Monocytes % 12.5      Eosinophils % 0.7      Basophils % 0.2      Neutrophils Absolute 5.52      Immature Granulocytes Absolute, Automated 0.02      Lymphocytes Absolute 1.74      Monocytes Absolute 1.05 (*)     Eosinophils Absolute 0.06      Basophils Absolute 0.02     SYPHILIS SCREENING WITH REFLEX   URINALYSIS WITH REFLEX CULTURE AND MICROSCOPIC    Narrative:     The following orders were created for panel order Urinalysis with Reflex Culture and Microscopic.  Procedure                               Abnormality         Status                     ---------                               -----------         ------                     Urinalysis with Reflex C...[[id number]]                                                 Extra Urine Gray Tube[[id number]]                                                         Please view results for these tests on the individual orders.   DRUG SCREEN,URINE   ACUTE TOXICOLOGY PANEL, BLOOD   URINALYSIS WITH REFLEX CULTURE AND MICROSCOPIC   EXTRA URINE GRAY TUBE     All other labs were within normal range or not returned as of this dictation.  Imaging  CT head wo IV contrast   Final Result   NO ACUTE INTRACRANIAL PROCESS. SKULL INTACT        NO ACUTE FRACTURE OR SUBLUXATION IN THE CERVICAL SPINE        THIS REPORT SERVES AS THE DIAGNOSTIC INTERPRETATION FOR TWO EXAMS   PERFORMED CONCURRENTLY: CT BRAIN WITHOUT IV CONTRAST AND CT CERVICAL   SPINE WITHOUT IV CONTRAST        MACRO:   None        Signed by: Raudel Bautista 5/15/2025 2:52 PM   Dictation workstation:   [id number]      CT cervical spine wo  "IV contrast   Final Result   NO ACUTE INTRACRANIAL PROCESS. SKULL INTACT        NO ACUTE FRACTURE OR SUBLUXATION IN THE CERVICAL SPINE        THIS REPORT SERVES AS THE DIAGNOSTIC INTERPRETATION FOR TWO EXAMS   PERFORMED CONCURRENTLY: CT BRAIN WITHOUT IV CONTRAST AND CT CERVICAL   SPINE WITHOUT IV CONTRAST        MACRO:   None        Signed by: Raudel Bautista 5/15/2025 2:52 PM   Dictation workstation:   [id number]      XR shoulder right 2+ views   Final Result   1. No evidence of acute fracture or dislocation.   2. Degenerative changes, as described above.        MACRO:   None.        Signed by: Guillermo Hastings 5/15/2025 2:12 PM   Dictation workstation:   [id number]      XR elbow right 1-2 views   Final Result   1.  No displaced fracture or dislocation is identified.        MACRO:   None        Signed by: Guillermo Hastings 5/15/2025 2:14 PM   Dictation workstation:   [id number]      XR forearm right 2 views   Final Result   1. No evidence of acute fracture or dislocation.        MACRO:   None.        Signed by: Guillermo Hastings 5/15/2025 2:13 PM   Dictation workstation:   [id number]      XR chest 1 view   Final Result   No focal infiltrate or pneumothorax is identified.        MACRO:   None.        Signed by: Guillermo Hastings 5/15/2025 2:13 PM   Dictation workstation:   [id number]      XR humerus right   Final Result   1. No evidence of acute fracture or dislocation.   2. Degenerative changes, as described above.        MACRO:   None.        Signed by: Guillermo Hastings 5/15/2025 2:12 PM   Dictation workstation:   [id number]         Procedure  Procedures  EMERGENCY DEPARTMENT COURSE/MDM:   Medical Decision Making    Vitals:    Vitals:    05/15/25 1233 05/15/25 1243   BP: 105/68 114/77   BP Location: Left arm Left arm   Patient Position: Sitting Sitting   Pulse: 78 74   Resp: 20 20   Temp: 36.6 °C (97.9 °F)    TempSrc: Temporal    SpO2: 97% 97%   Weight: 56.7 kg (125 lb)    Height: 1.626 m (5' 4\")      Cristel Boyd is a " female 69 y.o. who presents to the ER for mechanical fall with prolonged downtime and secondary head injury after head strike from opening door. On arrival the patients vital signs were: Afebrile, regular heart rate, normotensive, regular respiration rate, normoxic on room air. History obtained from: patient.  Given mechanical fall, right arm pain c-collar placed on arrival.  Primary survey unremarkable for acute life-threatening abnormality.  Secondary survey notable for tenderness to neck, right arm or right chest wall.  Given plan for CT head, C-spine injury, CBC CMP and CK for prolonged downtime, x-rays of right arm and chest given sites of tenderness.  Fall was mechanical, no chest pain, no shortness of breath, no fainting, I do not suspect PE or ACS at this time, troponins and D-dimer deferred.  Given history of syphilis, substance use, syphilis screen, UA, drug screen and tox panel ordered.  ED Course as of 05/15/25 1547   Thu May 15, 2025   1444 Creatine Kinase(!): 2,912  Elevated concerning for potential rhabdo [CB]   1446 XR shoulder right 2+ views  1. No evidence of acute fracture or dislocation.  2. Degenerative changes, as described above.   [CB]   1446 XR elbow right 1-2 views  1.  No displaced fracture or dislocation is identified. [CB]   1446 XR chest 1 view  No focal infiltrate or pneumothorax is identified. [CB]   1446 CBC and Auto Differential(!)  Compared to 2 weeks ago, no acute leukocytosis, leukopenia, thrombocytosis, thrombocytopenia, or worsening anemia [CB]   1531 CT head wo IV contrast  NO ACUTE INTRACRANIAL PROCESS. SKULL INTACT [CB]   1532 CT cervical spine wo IV contrast  NO ACUTE FRACTURE OR SUBLUXATION IN THE CERVICAL SPINE [CB]      ED Course User Index  [CB] Dale Turner DO         Diagnoses as of 05/15/25 1547   Traumatic rhabdomyolysis, initial encounter   Fall, initial encounter   Closed head injury, initial encounter     External Records Reviewed: I reviewed recent and  relevant outside records including inpatient notes, outpatient records  Prescription Drug Consideration: Acetaminophen provided for analgesia, 1 L NS provided for rehydration    Shared decision making for disposition  Patient and/or patient´s representative was counseled regarding labs, imaging, likely diagnosis. All questions were answered. Recommendation was made   for Admission given the need for further escalation of care to inpatient management. Patient agreed and was admitted in stable condition. Admitting team was notified of any pending labs or imaging to ensure continuity of care.     ED Medications administered this visit:    Medications   acetaminophen (Tylenol) tablet 975 mg (has no administration in time range)   sodium chloride 0.9 % bolus 1,000 mL (1,000 mL intravenous New Bag 5/15/25 7577)        Final Impression:   1. Traumatic rhabdomyolysis, initial encounter    2. Fall, initial encounter    3. Closed head injury, initial encounter          Please excuse any misspellings or unintended errors related to the Dragon speech recognition software used to dictate this note.    I reviewed the case with the attending ED physician. The attending ED physician agrees with the plan.        Dale Turner DO  Resident  05/15/25 3950         [1] History reviewed. No pertinent past medical history.  [2] History reviewed. No pertinent surgical history.  [3] No family history on file.  [4]   Social History  Tobacco Use    Smoking status: Not on file    Smokeless tobacco: Not on file   Substance Use Topics    Alcohol use: Not on file    Drug use: Not on file        Dale Turner DO  Resident  05/15/25 2583

## 2025-05-15 NOTE — PROGRESS NOTES
Advanced Practice Admission Note    History Of Present Illness  Cristel Boyd is a 69 y.o. female with history significant for hypertension, hyperlipidemia, hemiplegia right side, tardive dyskinesia, schizoaffective, bipolar, schizophrenia, syphilis, falls, vitamin D deficiency, hepatitis presenting to [facility] on 5/15/2025 from [facility] with fall.  Patient tells me she went to get up and slid on her footies, patient states she hit her head and hurt her lower back.  Patient tells me she could not get up and was laying on the floor for several hours due to pain.  Patient denies loss of consciousness.  Patient denies dizziness, vision changes, sweats, chills, chest pain, palpitations, shortness of breath, abdominal pain, melena or hematochezia, urinary symptoms. Pt very sleepy but easily arousable on exam.         ED Course:  Vital Signs: Heart rate 72, respiratory rate 17, /77, pulse ox 97% on room air  CMP: Glucose 86, sodium 137, potassium 3.7, chloride 103, bicarb 30, anion gap 8, BUN 22, creatinine 0.46, calcium 9.2, albumin 3.3, alk phos 52, ALT 12, AST 54, total bili 0.3, CK 2912, total protein 6.7.  CBC with diff: WBC 8.4, hemoglobin 10.5, hematocrit 32.3, platelets 205.  UA pending  CXR: Negative for acute process  CT head negative for acute process; CT C-spine negative for acute process; x-ray right shoulder revealed degenerative changes; x-ray right elbow negative for acute process; x-ray right forearm negative for acute process; right humerus x-ray negative for acute process.  Medications Given: IV fluids x 1 L, acetaminophen  Disposition: Observation admission to telemetry    Past Medical History  Medical History[1]    Surgical History  Surgical History[2]     Social History  She reports that she has never smoked. She has never used smokeless tobacco. She reports that she does not currently use alcohol. She reports that she does not use drugs.    Family History  Family History[3]      Allergies  Lidocaine    Review of Systems   Constitutional:  Negative for chills and fever.   HENT:  Negative for sore throat and trouble swallowing.    Respiratory:  Negative for cough and shortness of breath.    Cardiovascular:  Negative for chest pain and palpitations.   Gastrointestinal:  Negative for abdominal pain, constipation and diarrhea.   Genitourinary:  Negative for difficulty urinating and dysuria.   Musculoskeletal:  Positive for arthralgias and back pain. Negative for neck pain and neck stiffness.   Skin:  Negative for color change and rash.   Neurological:  Positive for weakness. Negative for dizziness and headaches.   Psychiatric/Behavioral:  Negative for agitation and confusion.         Physical Exam  Constitutional:       Appearance: Normal appearance.   HENT:      Head: Normocephalic.      Nose: Nose normal.      Mouth/Throat:      Mouth: Mucous membranes are dry.   Eyes:      Extraocular Movements: Extraocular movements intact.   Cardiovascular:      Rate and Rhythm: Normal rate and regular rhythm.   Pulmonary:      Effort: Pulmonary effort is normal.      Breath sounds: Normal breath sounds.   Abdominal:      General: Bowel sounds are normal.      Palpations: Abdomen is soft.   Musculoskeletal:         General: Tenderness present.      Cervical back: Normal range of motion.      Comments: Patient moves all extremities, limited range of motion right arm due to pain.  Patient complains of lower back pain with tenderness to palpation midline.   Skin:     General: Skin is warm and dry.      Capillary Refill: Capillary refill takes less than 2 seconds.   Neurological:      General: No focal deficit present.      Mental Status: She is alert and oriented to person, place, and time.   Psychiatric:         Mood and Affect: Mood normal.         Behavior: Behavior normal.          Last Recorded Vitals  Visit Vitals  /77 (BP Location: Left arm, Patient Position: Sitting)   Pulse 72   Temp 36.6 °C  "(97.9 °F) (Temporal)   Resp 17   Ht 1.626 m (5' 4\")   Wt 56.7 kg (125 lb)   SpO2 (!) 93%   BMI 21.46 kg/m²   OB Status Postmenopausal   Smoking Status Never   BSA 1.6 m²        Relevant Results  Results for orders placed or performed during the hospital encounter of 05/15/25 (from the past 24 hours)   Comprehensive metabolic panel   Result Value Ref Range    Glucose 86 74 - 99 mg/dL    Sodium 137 136 - 145 mmol/L    Potassium 3.7 3.5 - 5.3 mmol/L    Chloride 103 98 - 107 mmol/L    Bicarbonate 30 21 - 32 mmol/L    Anion Gap 8 (L) 10 - 20 mmol/L    Urea Nitrogen 22 6 - 23 mg/dL    Creatinine 0.46 (L) 0.50 - 1.05 mg/dL    eGFR >90 >60 mL/min/1.73m*2    Calcium 9.2 8.6 - 10.3 mg/dL    Albumin 3.3 (L) 3.4 - 5.0 g/dL    Alkaline Phosphatase 52 33 - 136 U/L    Total Protein 6.7 6.4 - 8.2 g/dL    AST 54 (H) 9 - 39 U/L    Bilirubin, Total 0.3 0.0 - 1.2 mg/dL    ALT 12 7 - 45 U/L   Creatine Kinase   Result Value Ref Range    Creatine Kinase 2,912 (H) 0 - 215 U/L   CBC and Auto Differential   Result Value Ref Range    WBC 8.4 4.4 - 11.3 x10*3/uL    nRBC 0.0 0.0 - 0.0 /100 WBCs    RBC 3.45 (L) 4.00 - 5.20 x10*6/uL    Hemoglobin 10.5 (L) 12.0 - 16.0 g/dL    Hematocrit 32.3 (L) 36.0 - 46.0 %    MCV 94 80 - 100 fL    MCH 30.4 26.0 - 34.0 pg    MCHC 32.5 32.0 - 36.0 g/dL    RDW 13.6 11.5 - 14.5 %    Platelets 205 150 - 450 x10*3/uL    Neutrophils % 65.7 40.0 - 80.0 %    Immature Granulocytes %, Automated 0.2 0.0 - 0.9 %    Lymphocytes % 20.7 13.0 - 44.0 %    Monocytes % 12.5 2.0 - 10.0 %    Eosinophils % 0.7 0.0 - 6.0 %    Basophils % 0.2 0.0 - 2.0 %    Neutrophils Absolute 5.52 1.20 - 7.70 x10*3/uL    Immature Granulocytes Absolute, Automated 0.02 0.00 - 0.70 x10*3/uL    Lymphocytes Absolute 1.74 1.20 - 4.80 x10*3/uL    Monocytes Absolute 1.05 (H) 0.10 - 1.00 x10*3/uL    Eosinophils Absolute 0.06 0.00 - 0.70 x10*3/uL    Basophils Absolute 0.02 0.00 - 0.10 x10*3/uL        Home Medications  Prior to Admission medications  "   Medication Sig Start Date End Date Taking? Authorizing Provider   aspirin 81 mg EC tablet Take 1 tablet (81 mg) by mouth once daily.   Yes Historical Provider, MD   atorvastatin (Lipitor) 80 mg tablet Take 1 tablet (80 mg) by mouth once daily at bedtime.   Yes Historical Provider, MD   brimonidine-timoloL (Combigan) 0.2-0.5 % ophthalmic solution Administer 1 drop into the left eye 2 times a day.   Yes Historical Provider, MD   cholecalciferol (Vitamin D-3) 25 mcg (1,000 units) capsule Take 1 capsule (25 mcg) by mouth once daily.   Yes Historical Provider, MD   cyanocobalamin (Vitamin B-12) 1,000 mcg tablet Take 1 tablet (1,000 mcg) by mouth once daily.   Yes Historical Provider, MD   divalproex (Depakote) 250 mg EC tablet Take 1 tablet (250 mg) by mouth 3 times a day. Do not crush, chew, or split.   Yes Historical Provider, MD   DULoxetine (Drizalma Sprinkle) 40 mg capsule, delayed rel sprinkle capsule Take 1 capsule (40 mg) by mouth once daily.   Yes Historical Provider, MD   hydrOXYzine HCL (Atarax) 50 mg tablet Take 1 tablet (50 mg) by mouth once daily at bedtime.   Yes Historical Provider, MD   meloxicam (Mobic) 7.5 mg tablet Take 1 tablet (7.5 mg) by mouth once daily.   Yes Historical Provider, MD   mirtazapine (Remeron) 7.5 mg tablet Take 1 tablet (7.5 mg) by mouth once daily at bedtime.   Yes Historical Provider, MD   traZODone (Desyrel) 50 mg tablet Take 1 tablet (50 mg) by mouth as needed at bedtime for sleep. 5/13/25 5/27/25  Historical Provider, MD       Medications  Scheduled medications  Scheduled Medications[4]  Continuous medications  Continuous Medications[5]  PRN medications  acetaminophen, 650 mg, q4h PRN   Or  acetaminophen, 650 mg, q4h PRN   Or  acetaminophen, 650 mg, q4h PRN        Imaging  Imaging  CT head wo IV contrast  Result Date: 5/15/2025  NO ACUTE INTRACRANIAL PROCESS. SKULL INTACT   NO ACUTE FRACTURE OR SUBLUXATION IN THE CERVICAL SPINE   THIS REPORT SERVES AS THE DIAGNOSTIC  INTERPRETATION FOR TWO EXAMS PERFORMED CONCURRENTLY: CT BRAIN WITHOUT IV CONTRAST AND CT CERVICAL SPINE WITHOUT IV CONTRAST   MACRO: None   Signed by: Raudel Bautista 5/15/2025 2:52 PM Dictation workstation:   [id number]    CT cervical spine wo IV contrast  Result Date: 5/15/2025  NO ACUTE INTRACRANIAL PROCESS. SKULL INTACT   NO ACUTE FRACTURE OR SUBLUXATION IN THE CERVICAL SPINE   THIS REPORT SERVES AS THE DIAGNOSTIC INTERPRETATION FOR TWO EXAMS PERFORMED CONCURRENTLY: CT BRAIN WITHOUT IV CONTRAST AND CT CERVICAL SPINE WITHOUT IV CONTRAST   MACRO: None   Signed by: Raudel Bautista 5/15/2025 2:52 PM Dictation workstation:   [id number]    XR elbow right 1-2 views  Result Date: 5/15/2025  1.  No displaced fracture or dislocation is identified.   MACRO: None   Signed by: Guillermo Hastings 5/15/2025 2:14 PM Dictation workstation:   [id number]    XR forearm right 2 views  Result Date: 5/15/2025  1. No evidence of acute fracture or dislocation.   MACRO: None.   Signed by: Guillermo Hastings 5/15/2025 2:13 PM Dictation workstation:   [id number]    XR chest 1 view  Result Date: 5/15/2025  No focal infiltrate or pneumothorax is identified.   MACRO: None.   Signed by: Guillermo Hastings 5/15/2025 2:13 PM Dictation workstation:   [id number]    XR shoulder right 2+ views  Result Date: 5/15/2025  1. No evidence of acute fracture or dislocation. 2. Degenerative changes, as described above.   MACRO: None.   Signed by: Guillermo Hastings 5/15/2025 2:12 PM Dictation workstation:   [id number]    XR humerus right  Result Date: 5/15/2025  1. No evidence of acute fracture or dislocation. 2. Degenerative changes, as described above.   MACRO: None.   Signed by: Guillermo Hastings 5/15/2025 2:12 PM Dictation workstation:   [id number]      Cardiology, Vascular, and Other Imaging  No other imaging results found for the past 7 days         Assessment/Plan   Assessment & Plan  Rhabdomyolysis    Acute midline thoracic back pain    Right arm pain    IVFs, recheck CK  at 6pm and in am  Xray lumbar pending  Xrays right arm negative for acute process  Fall precautions  PT/OT eval and tx  Baseline EKG ordered  Cardiac diet     VTE prophylaxis:   DVT prophylaxis: subcutaneous Heparin    Code Status  Full code      Admission history and physical to follow by attending physician. Plan to resume home medications as appropriate when list is available from pharmacy, see orders for additional plan elements, management deferred to attending and consult physicians.    I spent a total of 45 minutes on the date of the service which included preparing to see the patient, face-to-face patient care, completing clinical documentation, obtaining and/or reviewing separately obtained history, performing a medically appropriate examination, counseling and educating the patient/family/caregiver, ordering medications, tests, or procedures, communicating with other HCPs (not separately reported), independently interpreting results (not separately reported), communicating results to the patient/family/caregiver, and care coordination (not separately reported).   Chelsea Howard, ANAHY-[facility]    Medical [place] [phone]  Attending physician Cara Feliciano MD;Anay*         [1]   Past Medical History:  Diagnosis Date    Bipolar affective (Multi)     Falls     Glaucoma     Hemiplegia (Multi)     Hepatitis     Hyperlipidemia     Hypertension     Schizoaffective disorder (Multi)     Schizophrenia     Syphilis     Vitamin D deficiency    [2] History reviewed. No pertinent surgical history.  [3]   Family History  Problem Relation Name Age of Onset    Other (Chronic kidney disease) Father     [4] acetaminophen, 975 mg, oral, Once  oxyCODONE, 2.5 mg, oral, Once  polyethylene glycol, 17 g, oral, Daily  sodium chloride, 1,000 mL, intravenous, Once  [5]

## 2025-05-15 NOTE — CARE PLAN
"The patient's goals for the shift include \"to eat dinner\"    The clinical goals for the shift include\"Patient will remain free from falls through discharge.    "

## 2025-05-15 NOTE — H&P
History Of Present Illness  Cristel Boyd is a 69 y.o. female   PMH; hypertension, hyperlipidemia, hemiplegia right side, tardive dyskinesia, schizoaffective, bipolar, schizophrenia, syphilis, falls, vitamin D deficiency, hepatitis.    Patient is presenting to [facility] on 5/15/2025 from [facility] with fall.  Patient tells me she went to get up and slid on her footies, patient states she hit her head and hurt her lower back.  Patient tells me she could not get up and was laying on the floor for several hours due to pain.  Patient denies loss of consciousness.  Patient denies dizziness, vision changes, sweats, chills, chest pain, palpitations, shortness of breath, abdominal pain, melena or hematochezia, urinary symptoms.        Past Medical History  She has a past medical history of Bipolar affective (Multi), Falls, Glaucoma, Hemiplegia (Multi), Hepatitis, Hyperlipidemia, Hypertension, Schizoaffective disorder (Multi), Schizophrenia, Syphilis, and Vitamin D deficiency.    Surgical History  She has no past surgical history on file.     Social History  She reports that she has never smoked. She has never used smokeless tobacco. She reports that she does not currently use alcohol. She reports that she does not use drugs.    Family History  Family History[1]     Allergies  Lidocaine  Scheduled medications  Scheduled Medications[2]  Continuous medications  Continuous Medications[3]  PRN medications  PRN Medications[4]    Review of Systems   12 systems were reviewed and pertinent findings were addressed in HPI    Physical Exam  GENERAL: No distress.   SKIN: No rashes or lesions.  EYES: PERRLA, EOMI  HEENT: Head: Normocephalic  Neck: supple and no adenopathy  LUNGS: Lungs clear to auscultation.   CARDIAC: Normal S1 and S2; no murmurs.   ABDOMEN: Soft, non-tender.   EXTREMITIES: No ulcers, Extremities normal.   NEURO: No focal deficit.      Last Recorded Vitals  Blood pressure 114/77, pulse 72, temperature 36.6  "°C (97.9 °F), temperature source Temporal, resp. rate 17, height 1.626 m (5' 4\"), weight 56.7 kg (125 lb), SpO2 (!) 93%.    Relevant Results        Results for orders placed or performed during the hospital encounter of 05/15/25 (from the past 24 hours)   Comprehensive metabolic panel   Result Value Ref Range    Glucose 86 74 - 99 mg/dL    Sodium 137 136 - 145 mmol/L    Potassium 3.7 3.5 - 5.3 mmol/L    Chloride 103 98 - 107 mmol/L    Bicarbonate 30 21 - 32 mmol/L    Anion Gap 8 (L) 10 - 20 mmol/L    Urea Nitrogen 22 6 - 23 mg/dL    Creatinine 0.46 (L) 0.50 - 1.05 mg/dL    eGFR >90 >60 mL/min/1.73m*2    Calcium 9.2 8.6 - 10.3 mg/dL    Albumin 3.3 (L) 3.4 - 5.0 g/dL    Alkaline Phosphatase 52 33 - 136 U/L    Total Protein 6.7 6.4 - 8.2 g/dL    AST 54 (H) 9 - 39 U/L    Bilirubin, Total 0.3 0.0 - 1.2 mg/dL    ALT 12 7 - 45 U/L   Creatine Kinase   Result Value Ref Range    Creatine Kinase 2,912 (H) 0 - 215 U/L   CBC and Auto Differential   Result Value Ref Range    WBC 8.4 4.4 - 11.3 x10*3/uL    nRBC 0.0 0.0 - 0.0 /100 WBCs    RBC 3.45 (L) 4.00 - 5.20 x10*6/uL    Hemoglobin 10.5 (L) 12.0 - 16.0 g/dL    Hematocrit 32.3 (L) 36.0 - 46.0 %    MCV 94 80 - 100 fL    MCH 30.4 26.0 - 34.0 pg    MCHC 32.5 32.0 - 36.0 g/dL    RDW 13.6 11.5 - 14.5 %    Platelets 205 150 - 450 x10*3/uL    Neutrophils % 65.7 40.0 - 80.0 %    Immature Granulocytes %, Automated 0.2 0.0 - 0.9 %    Lymphocytes % 20.7 13.0 - 44.0 %    Monocytes % 12.5 2.0 - 10.0 %    Eosinophils % 0.7 0.0 - 6.0 %    Basophils % 0.2 0.0 - 2.0 %    Neutrophils Absolute 5.52 1.20 - 7.70 x10*3/uL    Immature Granulocytes Absolute, Automated 0.02 0.00 - 0.70 x10*3/uL    Lymphocytes Absolute 1.74 1.20 - 4.80 x10*3/uL    Monocytes Absolute 1.05 (H) 0.10 - 1.00 x10*3/uL    Eosinophils Absolute 0.06 0.00 - 0.70 x10*3/uL    Basophils Absolute 0.02 0.00 - 0.10 x10*3/uL          Assessment/Plan   Assessment & Plan  Rhabdomyolysis    Acute midline thoracic back pain    Right arm " pain      IVFs, recheck CK at 6pm and in am  Xray lumbar pending  Xrays right arm negative for acute process  Fall precautions  PT/OT eval and tx  Baseline EKG ordered  Cardiac diet        Gama Dela Cruz MD         [1]   Family History  Problem Relation Name Age of Onset    Other (Chronic kidney disease) Father     [2] acetaminophen, 975 mg, oral, Once  [START ON 5/16/2025] aspirin, 81 mg, oral, Daily  [Held by provider] atorvastatin, 80 mg, oral, Nightly  brimonidine-timoloL, 1 drop, Left Eye, BID  divalproex, 250 mg, oral, TID  DULoxetine, 40 mg, oral, Daily  [Held by provider] hydrOXYzine HCL, 50 mg, oral, Nightly  [START ON 5/16/2025] meloxicam, 7.5 mg, oral, Daily  [Held by provider] mirtazapine, 7.5 mg, oral, Nightly  oxyCODONE, 2.5 mg, oral, Once  polyethylene glycol, 17 g, oral, Daily  [3]    [4] PRN medications: acetaminophen **OR** acetaminophen **OR** acetaminophen, [Held by provider] traZODone

## 2025-05-16 PROBLEM — T79.6XXA TRAUMATIC RHABDOMYOLYSIS, INITIAL ENCOUNTER: Status: ACTIVE | Noted: 2025-05-16

## 2025-05-16 LAB
ANION GAP SERPL CALC-SCNC: 9 MMOL/L (ref 10–20)
ATRIAL RATE: 80 BPM
BUN SERPL-MCNC: 17 MG/DL (ref 6–23)
CALCIUM SERPL-MCNC: 8.5 MG/DL (ref 8.6–10.3)
CHLORIDE SERPL-SCNC: 108 MMOL/L (ref 98–107)
CK SERPL-CCNC: 1110 U/L (ref 0–215)
CK SERPL-CCNC: 1532 U/L (ref 0–215)
CK SERPL-CCNC: 1681 U/L (ref 0–215)
CO2 SERPL-SCNC: 26 MMOL/L (ref 21–32)
CREAT SERPL-MCNC: 0.48 MG/DL (ref 0.5–1.05)
EGFRCR SERPLBLD CKD-EPI 2021: >90 ML/MIN/1.73M*2
ERYTHROCYTE [DISTWIDTH] IN BLOOD BY AUTOMATED COUNT: 13.7 % (ref 11.5–14.5)
GLUCOSE SERPL-MCNC: 78 MG/DL (ref 74–99)
HCT VFR BLD AUTO: 31.2 % (ref 36–46)
HGB BLD-MCNC: 9.9 G/DL (ref 12–16)
HOLD SPECIMEN: NORMAL
MCH RBC QN AUTO: 30.3 PG (ref 26–34)
MCHC RBC AUTO-ENTMCNC: 31.7 G/DL (ref 32–36)
MCV RBC AUTO: 95 FL (ref 80–100)
NRBC BLD-RTO: 0 /100 WBCS (ref 0–0)
P AXIS: 82 DEGREES
P OFFSET: 185 MS
P ONSET: 133 MS
PLATELET # BLD AUTO: 205 X10*3/UL (ref 150–450)
POTASSIUM SERPL-SCNC: 3.6 MMOL/L (ref 3.5–5.3)
PR INTERVAL: 152 MS
Q ONSET: 209 MS
QRS COUNT: 13 BEATS
QRS DURATION: 118 MS
QT INTERVAL: 380 MS
QTC CALCULATION(BAZETT): 438 MS
QTC FREDERICIA: 418 MS
R AXIS: -60 DEGREES
RBC # BLD AUTO: 3.27 X10*6/UL (ref 4–5.2)
RPR SER QL: NONREACTIVE
SODIUM SERPL-SCNC: 139 MMOL/L (ref 136–145)
T AXIS: 85 DEGREES
T OFFSET: 399 MS
T PALLIDUM AB SER QL AGGL: REACTIVE
TREPONEMA PALLIDUM IGG+IGM AB [PRESENCE] IN SERUM OR PLASMA BY IMMUNOASSAY: REACTIVE
VENTRICULAR RATE: 80 BPM
WBC # BLD AUTO: 5.2 X10*3/UL (ref 4.4–11.3)

## 2025-05-16 PROCEDURE — 97165 OT EVAL LOW COMPLEX 30 MIN: CPT | Mod: GO

## 2025-05-16 PROCEDURE — 36415 COLL VENOUS BLD VENIPUNCTURE: CPT | Performed by: NURSE PRACTITIONER

## 2025-05-16 PROCEDURE — 2500000002 HC RX 250 W HCPCS SELF ADMINISTERED DRUGS (ALT 637 FOR MEDICARE OP, ALT 636 FOR OP/ED): Performed by: NURSE PRACTITIONER

## 2025-05-16 PROCEDURE — 2500000001 HC RX 250 WO HCPCS SELF ADMINISTERED DRUGS (ALT 637 FOR MEDICARE OP): Performed by: NURSE PRACTITIONER

## 2025-05-16 PROCEDURE — 1200000002 HC GENERAL ROOM WITH TELEMETRY DAILY

## 2025-05-16 PROCEDURE — 2500000004 HC RX 250 GENERAL PHARMACY W/ HCPCS (ALT 636 FOR OP/ED): Mod: JZ | Performed by: NURSE PRACTITIONER

## 2025-05-16 PROCEDURE — 82550 ASSAY OF CK (CPK): CPT | Performed by: NURSE PRACTITIONER

## 2025-05-16 PROCEDURE — 97161 PT EVAL LOW COMPLEX 20 MIN: CPT | Mod: GP

## 2025-05-16 PROCEDURE — 96361 HYDRATE IV INFUSION ADD-ON: CPT

## 2025-05-16 PROCEDURE — 85027 COMPLETE CBC AUTOMATED: CPT | Performed by: NURSE PRACTITIONER

## 2025-05-16 PROCEDURE — 99223 1ST HOSP IP/OBS HIGH 75: CPT | Performed by: NURSE PRACTITIONER

## 2025-05-16 PROCEDURE — 80048 BASIC METABOLIC PNL TOTAL CA: CPT | Performed by: NURSE PRACTITIONER

## 2025-05-16 PROCEDURE — 2500000005 HC RX 250 GENERAL PHARMACY W/O HCPCS: Performed by: INTERNAL MEDICINE

## 2025-05-16 RX ORDER — SODIUM CHLORIDE 9 MG/ML
100 INJECTION, SOLUTION INTRAVENOUS CONTINUOUS
Status: DISCONTINUED | OUTPATIENT
Start: 2025-05-17 | End: 2025-05-16

## 2025-05-16 RX ORDER — SODIUM CHLORIDE 9 MG/ML
100 INJECTION, SOLUTION INTRAVENOUS CONTINUOUS
Status: ACTIVE | OUTPATIENT
Start: 2025-05-16 | End: 2025-05-16

## 2025-05-16 RX ORDER — TRAZODONE HYDROCHLORIDE 50 MG/1
25 TABLET ORAL ONCE AS NEEDED
Status: COMPLETED | OUTPATIENT
Start: 2025-05-16 | End: 2025-05-16

## 2025-05-16 RX ORDER — POTASSIUM CHLORIDE 20 MEQ/1
20 TABLET, EXTENDED RELEASE ORAL ONCE
Status: COMPLETED | OUTPATIENT
Start: 2025-05-16 | End: 2025-05-16

## 2025-05-16 RX ORDER — SODIUM CHLORIDE 9 MG/ML
100 INJECTION, SOLUTION INTRAVENOUS CONTINUOUS
Status: ACTIVE | OUTPATIENT
Start: 2025-05-16 | End: 2025-05-17

## 2025-05-16 RX ADMIN — MELOXICAM 7.5 MG: 7.5 TABLET ORAL at 08:22

## 2025-05-16 RX ADMIN — DIVALPROEX SODIUM 250 MG: 250 TABLET, DELAYED RELEASE ORAL at 15:32

## 2025-05-16 RX ADMIN — DIVALPROEX SODIUM 250 MG: 250 TABLET, DELAYED RELEASE ORAL at 08:21

## 2025-05-16 RX ADMIN — BRIMONIDINE TARTRATE 1 DROP: 2 SOLUTION/ DROPS OPHTHALMIC at 08:23

## 2025-05-16 RX ADMIN — ASPIRIN 81 MG: 81 TABLET, COATED ORAL at 08:20

## 2025-05-16 RX ADMIN — TIMOLOL MALEATE 1 DROP: 5 SOLUTION/ DROPS OPHTHALMIC at 12:52

## 2025-05-16 RX ADMIN — TRAZODONE HYDROCHLORIDE 25 MG: 50 TABLET ORAL at 22:14

## 2025-05-16 RX ADMIN — DULOXETINE HYDROCHLORIDE 40 MG: 20 CAPSULE, DELAYED RELEASE ORAL at 08:22

## 2025-05-16 RX ADMIN — SODIUM CHLORIDE 100 ML/HR: 0.9 INJECTION, SOLUTION INTRAVENOUS at 08:31

## 2025-05-16 RX ADMIN — DIVALPROEX SODIUM 250 MG: 250 TABLET, DELAYED RELEASE ORAL at 21:20

## 2025-05-16 RX ADMIN — POTASSIUM CHLORIDE 20 MEQ: 1500 TABLET, EXTENDED RELEASE ORAL at 08:21

## 2025-05-16 RX ADMIN — ACETAMINOPHEN 650 MG: 325 TABLET ORAL at 08:19

## 2025-05-16 ASSESSMENT — PAIN - FUNCTIONAL ASSESSMENT
PAIN_FUNCTIONAL_ASSESSMENT: 0-10

## 2025-05-16 ASSESSMENT — COGNITIVE AND FUNCTIONAL STATUS - GENERAL
HELP NEEDED FOR BATHING: A LITTLE
TURNING FROM BACK TO SIDE WHILE IN FLAT BAD: A LITTLE
DRESSING REGULAR LOWER BODY CLOTHING: A LITTLE
DAILY ACTIVITIY SCORE: 18
MOBILITY SCORE: 17
DRESSING REGULAR LOWER BODY CLOTHING: A LITTLE
MOVING TO AND FROM BED TO CHAIR: A LITTLE
TOILETING: A LITTLE
MOVING TO AND FROM BED TO CHAIR: A LITTLE
DRESSING REGULAR LOWER BODY CLOTHING: A LITTLE
TOILETING: A LITTLE
HELP NEEDED FOR BATHING: A LITTLE
HELP NEEDED FOR BATHING: A LOT
DRESSING REGULAR UPPER BODY CLOTHING: A LITTLE
MOBILITY SCORE: 18
TOILETING: A LITTLE
CLIMB 3 TO 5 STEPS WITH RAILING: A LOT
DRESSING REGULAR UPPER BODY CLOTHING: A LITTLE
WALKING IN HOSPITAL ROOM: A LITTLE
DAILY ACTIVITIY SCORE: 20
TURNING FROM BACK TO SIDE WHILE IN FLAT BAD: A LITTLE
STANDING UP FROM CHAIR USING ARMS: A LITTLE
STANDING UP FROM CHAIR USING ARMS: A LITTLE
WALKING IN HOSPITAL ROOM: A LITTLE
MOBILITY SCORE: 18
DAILY ACTIVITIY SCORE: 20
DRESSING REGULAR UPPER BODY CLOTHING: A LITTLE
CLIMB 3 TO 5 STEPS WITH RAILING: TOTAL
TURNING FROM BACK TO SIDE WHILE IN FLAT BAD: A LITTLE
CLIMB 3 TO 5 STEPS WITH RAILING: A LOT
PERSONAL GROOMING: A LITTLE
WALKING IN HOSPITAL ROOM: A LITTLE
MOVING TO AND FROM BED TO CHAIR: A LITTLE
STANDING UP FROM CHAIR USING ARMS: A LITTLE

## 2025-05-16 ASSESSMENT — ACTIVITIES OF DAILY LIVING (ADL): BATHING_ASSISTANCE: MODERATE

## 2025-05-16 ASSESSMENT — PAIN SCALES - GENERAL
PAINLEVEL_OUTOF10: 0 - NO PAIN
PAINLEVEL_OUTOF10: 7
PAINLEVEL_OUTOF10: 5 - MODERATE PAIN
PAINLEVEL_OUTOF10: 5 - MODERATE PAIN
PAINLEVEL_OUTOF10: 7

## 2025-05-16 ASSESSMENT — PAIN DESCRIPTION - DESCRIPTORS: DESCRIPTORS: ACHING

## 2025-05-16 ASSESSMENT — PAIN DESCRIPTION - ORIENTATION: ORIENTATION: RIGHT

## 2025-05-16 ASSESSMENT — PAIN DESCRIPTION - LOCATION: LOCATION: SHOULDER

## 2025-05-16 NOTE — CARE PLAN
The patient's goals for the shift include sleep     The clinical goals for the shift include remain free of falls and injury      Problem: Fall/Injury  Goal: Not fall by end of shift  Outcome: Progressing  Goal: Be free from injury by end of the shift  Outcome: Progressing  Goal: Verbalize understanding of personal risk factors for fall in the hospital  Outcome: Progressing  Goal: Verbalize understanding of risk factor reduction measures to prevent injury from fall in the home  Outcome: Progressing  Goal: Use assistive devices by end of the shift  Outcome: Progressing  Goal: Pace activities to prevent fatigue by end of the shift  Outcome: Progressing     Problem: Pain  Goal: Takes deep breaths with improved pain control throughout the shift  Outcome: Progressing  Goal: Turns in bed with improved pain control throughout the shift  Outcome: Progressing  Goal: Walks with improved pain control throughout the shift  Outcome: Progressing  Goal: Performs ADL's with improved pain control throughout shift  Outcome: Progressing  Goal: Participates in PT with improved pain control throughout the shift  Outcome: Progressing  Goal: Free from opioid side effects throughout the shift  Outcome: Progressing  Goal: Free from acute confusion related to pain meds throughout the shift  Outcome: Progressing

## 2025-05-16 NOTE — PROGRESS NOTES
Occupational Therapy    Occupational Therapy    Evaluation    Patient Name: Cristel Boyd  MRN: [id number]  Today's Date: 5/16/2025  Time Calculation  Start Time: 1313  Stop Time: 1326  Time Calculation (min): 13 min  3110/3110-A    Assessment  IP OT Assessment  OT Assessment: Pt pleasant and cooperative. Pt requies increased assist with ADL's and mobility. Recommend SNF to increase safety and independence with ADL's.  Prognosis: Good  End of Session Communication: Bedside nurse  End of Session Patient Position: Bed, 3 rail up, Alarm on    Plan:  Treatment Interventions: ADL retraining, UE strengthening/ROM, Endurance training, Neuromuscular reeducation  OT Frequency: 3 times per week  OT Discharge Recommendations: Moderate intensity level of continued care (SNF)  Equipment Recommended upon Discharge: Wheeled walker  OT Recommended Transfer Status: Minimal assist  OT - OK to Discharge: Yes (to next level of care)    Subjective     Current Problem:  1. Traumatic rhabdomyolysis, initial encounter        2. Fall, initial encounter        3. Closed head injury, initial encounter            General:  General  Reason for Referral: ADL's  Referred By: Gama Dela Cruz MD  Past Medical History Relevant to Rehab: Cristel Boyd is a 69 y.o. female with history significant for hypertension, hyperlipidemia, hemiplegia right side, tardive dyskinesia, schizoaffective, bipolar, schizophrenia, syphilis, falls, vitamin D deficiency, hepatitis presenting to [facility] on 5/15/2025 from [facility] with fall.  Patient tells me she went to get up and slid on her footies, patient states she hit her head and hurt her lower back.  Patient tells me she could not get up and was laying on the floor for several hours due to pain.  Patient denies loss of consciousness.  Patient denies dizziness, vision changes, sweats, chills, chest pain, palpitations, shortness of breath, abdominal pain, melena or hematochezia, urinary symptoms. Pt  very sleepy but easily arousable on exam.  Prior to Session Communication: Bedside nurse  Patient Position Received: Bed, 3 rail up, Alarm on    Precautions:  Medical Precautions: Fall precautions  Precautions Comment: bed/chair alarm    Pain:  Pain Assessment  Pain Assessment: 0-10  0-10 (Numeric) Pain Score: 5 - Moderate pain  Pain Type: Acute pain (Pt with c/o head/neck pain s/p fall)    Objective     Cognition:  Overall Cognitive Status: Within Functional Limits (A&O x's 3)    Home Living:  Pt resides at [facility].     Prior Function:  Pt states she was independent with most ADL's. Pt does receive assist with bathing. Pt was able to complete sponge bathing. Pt does not use DME when ambulating in room, and uses a rollator when outside of the room. Pt does not feel like she has had any recent falls, but was in the ED 2/16/25 s/p fall OOB.     ADL:  Eating Assistance: Stand by  Grooming Assistance: Minimal  Bathing Assistance: Moderate  UE Dressing Assistance: Minimal  LE Dressing Assistance: Moderate  Toileting Assistance with Device: Minimal    Activity Tolerance:  Endurance: Tolerates less than 10 min exercise, no significant change in vital signs    Bed Mobility/Transfers:   Supine-sit: SBA  Sit-supine: SBA  Sit-stand: Min A  Stand-sit: CGA    Ambulation/Gait Training:  Pt ambulated a few feet using wheeled walker . Pt with unsteady gait    Sitting Balance:  Static Sitting Balance  Static Sitting-Comment/Number of Minutes: good  Dynamic Sitting Balance  Dynamic Sitting-Comments: fair+    Standing Balance:  Static Standing Balance  Static Standing-Comment/Number of Minutes: fair  Dynamic Standing Balance  Dynamic Standing-Comments: fair-    Sensation:  Light Touch: No apparent deficits    Strength:  Strength Comments: B UE's grossly 3/5    Extremities: RUE   RUE : Within Functional Limits and LUE   LUE: Within Functional Limits    Outcome Measures: [facility] Daily Activity  Putting on and taking  off regular lower body clothing: A little  Bathing (including washing, rinsing, drying): A lot  Putting on and taking off regular upper body clothing: A little  Toileting, which includes using toilet, bedpan or urinal: A little  Taking care of personal grooming such as brushing teeth: A little  Eating Meals: None  Daily Activity - Total Score: 18                       EDUCATION:  Education  Individual(s) Educated: Patient  Education Provided:  (safety)  Education Documentation  Body Mechanics, taught by Layne Araya OT at 5/16/2025  4:58 PM.  Learner: Patient  Readiness: Acceptance  Method: Explanation  Response: Verbalizes Understanding    Precautions, taught by Layne Araya OT at 5/16/2025  4:58 PM.  Learner: Patient  Readiness: Acceptance  Method: Explanation  Response: Verbalizes Understanding    Education Comments  No comments found.        Goals:   Encounter Problems       Encounter Problems (Active)       OT Goals       OT Goal 1 (Progressing)       Start:  05/16/25    Expected End:  05/30/25       Pt will complete all bed mobility independently          OT Goal 2 (Progressing)       Start:  05/16/25    Expected End:  05/30/25       Pt with complete all transfers safely with SBA           OT Goal 3 (Progressing)       Start:  05/16/25    Expected End:  05/30/25       Pt will complete ADL's and mobility with good stand balance            OT Goal 4 (Progressing)       Start:  05/16/25    Expected End:  05/30/25       Pt will complete UB dressing ADL's with SBA using adaptive device(s) as needed           OT Goal 5 (Progressing)       Start:  05/16/25    Expected End:  05/30/25       Pt will complete LB dressing with CGA using adaptive device as needed

## 2025-05-16 NOTE — PROGRESS NOTES
Cristel Boyd is a 69 y.o. female on day 0 of admission presenting with Rhabdomyolysis.      Subjective   Patient is feeling well, no N/V, no CP or SOB.          Objective          Vitals 24HR  Heart Rate:  [72-95]   Temp:  [36.2 °C (97.2 °F)-36.9 °C (98.4 °F)]   Resp:  [17-19]   BP: (105-124)/(72-87)   SpO2:  [92 %-95 %]     Intake/Output last 3 Shifts:    Intake/Output Summary (Last 24 hours) at 5/16/2025 1721  Last data filed at 5/16/2025 1600  Gross per 24 hour   Intake 3280 ml   Output --   Net 3280 ml       Physical Exam  GENERAL: No distress.   SKIN: No rashes or lesions.  EYES: PERRLA, EOMI  HEENT: Head: Normocephalic  Neck: supple and no adenopathy  LUNGS: Lungs clear to auscultation.   CARDIAC: Normal S1 and S2; no murmurs.   ABDOMEN: Soft, non-tender.   EXTREMITIES: No ulcers, Extremities normal.   NEURO: No focal deficit.     Relevant Results                    Scheduled medications  Scheduled Medications[1]  Continuous medications  Continuous Medications[2]  PRN medications  PRN Medications[3]  Results for orders placed or performed during the hospital encounter of 05/15/25 (from the past 24 hours)   Creatine Kinase   Result Value Ref Range    Creatine Kinase 2,173 (H) 0 - 215 U/L   Drug Screen, Urine   Result Value Ref Range    Amphetamine Screen, Urine Presumptive Negative Presumptive Negative    Barbiturate Screen, Urine Presumptive Negative Presumptive Negative    Benzodiazepines Screen, Urine Presumptive Negative Presumptive Negative    Cannabinoid Screen, Urine Presumptive Negative Presumptive Negative    Cocaine Metabolite Screen, Urine Presumptive Negative Presumptive Negative    Fentanyl Screen, Urine Presumptive Negative Presumptive Negative    Opiate Screen, Urine Presumptive Negative Presumptive Negative    Oxycodone Screen, Urine Presumptive Negative Presumptive Negative    PCP Screen, Urine Presumptive Negative Presumptive Negative    Methadone Screen, Urine Presumptive Negative  Presumptive Negative   Urinalysis with Reflex Culture and Microscopic   Result Value Ref Range    Color, Urine Light-Yellow Light-Yellow, Yellow, Dark-Yellow    Appearance, Urine Clear Clear    Specific Gravity, Urine 1.018 1.005 - 1.035    pH, Urine 6.5 5.0, 5.5, 6.0, 6.5, 7.0, 7.5, 8.0    Protein, Urine NEGATIVE NEGATIVE, 10 (TRACE), 20 (TRACE) mg/dL    Glucose, Urine Normal Normal mg/dL    Blood, Urine NEGATIVE NEGATIVE mg/dL    Ketones, Urine NEGATIVE NEGATIVE mg/dL    Bilirubin, Urine NEGATIVE NEGATIVE mg/dL    Urobilinogen, Urine Normal Normal mg/dL    Nitrite, Urine NEGATIVE NEGATIVE    Leukocyte Esterase, Urine NEGATIVE NEGATIVE   Extra Urine Gray Tube   Result Value Ref Range    Extra Tube Hold for add-ons.    ECG 12 lead   Result Value Ref Range    Ventricular Rate 80 BPM    Atrial Rate 80 BPM    IN Interval 152 ms    QRS Duration 118 ms    QT Interval 380 ms    QTC Calculation(Bazett) 438 ms    P Axis 82 degrees    R Axis -60 degrees    T Axis 85 degrees    QRS Count 13 beats    Q Onset 209 ms    P Onset 133 ms    P Offset 185 ms    T Offset 399 ms    QTC Fredericia 418 ms   Creatine Kinase   Result Value Ref Range    Creatine Kinase 1,681 (H) 0 - 215 U/L   CBC   Result Value Ref Range    WBC 5.2 4.4 - 11.3 x10*3/uL    nRBC 0.0 0.0 - 0.0 /100 WBCs    RBC 3.27 (L) 4.00 - 5.20 x10*6/uL    Hemoglobin 9.9 (L) 12.0 - 16.0 g/dL    Hematocrit 31.2 (L) 36.0 - 46.0 %    MCV 95 80 - 100 fL    MCH 30.3 26.0 - 34.0 pg    MCHC 31.7 (L) 32.0 - 36.0 g/dL    RDW 13.7 11.5 - 14.5 %    Platelets 205 150 - 450 x10*3/uL   Basic Metabolic Panel   Result Value Ref Range    Glucose 78 74 - 99 mg/dL    Sodium 139 136 - 145 mmol/L    Potassium 3.6 3.5 - 5.3 mmol/L    Chloride 108 (H) 98 - 107 mmol/L    Bicarbonate 26 21 - 32 mmol/L    Anion Gap 9 (L) 10 - 20 mmol/L    Urea Nitrogen 17 6 - 23 mg/dL    Creatinine 0.48 (L) 0.50 - 1.05 mg/dL    eGFR >90 >60 mL/min/1.73m*2    Calcium 8.5 (L) 8.6 - 10.3 mg/dL   Creatine Kinase   Result  Value Ref Range    Creatine Kinase 1,532 (H) 0 - 215 U/L   Creatine Kinase   Result Value Ref Range    Creatine Kinase 1,110 (H) 0 - 215 U/L           Assessment & Plan  Rhabdomyolysis    Acute midline thoracic back pain    Right arm pain    Traumatic rhabdomyolysis, initial encounter       69 y.o. female   PMH; hypertension, hyperlipidemia, hemiplegia right side, tardive dyskinesia, schizoaffective, bipolar, schizophrenia, syphilis, falls, vitamin D deficiency, hepatitis.    Patient is presenting to [facility] on 5/15/2025 from [facility] with fall.  Patient tells me she went to get up and slid on her footies, patient states she hit her head and hurt her lower back.  Patient tells me she could not get up and was laying on the floor for several hours due to pain.  Patient denies loss of consciousness.  Patient denies dizziness, vision changes, sweats, chills, chest pain, palpitations, shortness of breath, abdominal pain, melena or hematochezia, urinary symptoms.     // Rhabdomyolysis  // Acute midline thoracic back pain  // Right arm pain  Xray lumbar; Severe spondylosis. No fracture seen  Xrays right arm negative for acute process  Fall precautions  PT/OT eval and tx  Baseline EKG ordered  Cardiac diet      C/w IVFs, monitor labs.     Gama Dela Cruz MD           [1] acetaminophen, 975 mg, oral, Once  aspirin, 81 mg, oral, Daily  [Held by provider] atorvastatin, 80 mg, oral, Nightly  brimonidine, 1 drop, Left Eye, BID  divalproex, 250 mg, oral, TID  DULoxetine, 40 mg, oral, Daily  [Held by provider] hydrOXYzine HCL, 50 mg, oral, Nightly  meloxicam, 7.5 mg, oral, Daily  [Held by provider] mirtazapine, 7.5 mg, oral, Nightly  polyethylene glycol, 17 g, oral, Daily  timolol, 1 drop, Left Eye, BID  [2] sodium chloride 0.9%, 100 mL/hr, Last Rate: 100 mL/hr (05/16/25 0831)  [START ON 5/17/2025] sodium chloride 0.9%, 100 mL/hr  [3] PRN medications: acetaminophen **OR** acetaminophen **OR** acetaminophen, [Held by  provider] traZODone

## 2025-05-16 NOTE — PROGRESS NOTES
Physical Therapy    Physical Therapy Evaluation    Patient Name: Cristel Boyd  MRN: [id number]  Department: [facility] 3 S OBS  Room: [facility]03110-A  Today's Date: 5/16/2025   Time Calculation  Start Time: 0953  Stop Time: 1002  Time Calculation (min): 9 min    Assessment/Plan   PT Assessment  PT Assessment Results: Decreased strength, Decreased endurance, Impaired balance, Decreased mobility, Decreased range of motion, Decreased safety awareness, Pain  Rehab Prognosis: Good  Medical Staff Made Aware: Yes  End of Session Communication: Bedside nurse  Assessment Comment: Pt's impairments include generalized weakness, impaired balance and decreased activity tolerance. Pt's functional limitations include bed mobility, transfers, gait and elevations. Pt would benefit from continued acute care PT during hospital LOS and upon dc at a low level intensity.  End of Session Patient Position: Bed, 3 rail up, Alarm on  IP OR SWING BED PT PLAN  Inpatient or Swing Bed: Inpatient  PT Plan  Treatment/Interventions: Bed mobility, Stair training, Gait training, Transfer training, Balance training, Neuromuscular re-education, Strengthening, Endurance training, Range of motion, Therapeutic exercise, Home exercise program, Therapeutic activity, Positioning, Postural re-education  PT Plan: Ongoing PT  PT Frequency: 3 times per week  PT Discharge Recommendations: Low intensity level of continued care  Equipment Recommended upon Discharge: Wheeled walker  PT Recommended Transfer Status: Assistive device, Stand by assist  PT - OK to Discharge: Yes (Pt ok to dc from acute care PT to next level of care once cleared by medical team.)    Subjective     PT Visit Info:  PT Received On: 05/16/25  General Visit Information:  General  Reason for Referral: S/p fall at facility, currently being treated for rhabdomyolysis. XRs RUE all negative. XR lumbar spine negative. CT cspine negative. +HH. CTH (-).  Referred By: Gama Dela Cruz  Past Medical History  Relevant to Rehab: Medical History[1]    Prior to Session Communication: Bedside nurse  Patient Position Received: Bed, 3 rail up, Alarm on  General Comment: Pt agreeable to PT eval.  Home Living:  Home Living  Home Living Comments: Pt lives at [facility].  Prior Level of Function:  Prior Function Per Pt/Caregiver Report  Prior Function Comments: Pt reports at baseline she is ambulatory with a rollator walker. Walks to dining room. Does not currently receive therapy. IND with ADLs.  Precautions:  Precautions  Medical Precautions: Fall precautions    Objective   Pain:  Pain Assessment  Pain Assessment: 0-10  0-10 (Numeric) Pain Score: 7  Pain Type: Acute pain  Pain Location: Head  Cognition:  Cognition  Orientation Level: Disoriented to situation    General Assessments:  Activity Tolerance  Endurance: Decreased tolerance for upright activites (2/2 pain)    Sensation  Light Touch: No apparent deficits    Static Sitting Balance  Static Sitting-Comment/Number of Minutes: SBA  Dynamic Sitting Balance  Dynamic Sitting-Comments: SBA    Static Standing Balance  Static Standing-Comment/Number of Minutes: SBA with FWW  Dynamic Standing Balance  Dynamic Standing-Comments: SBA with FWW to take lateral steps to approach HOB.  Functional Assessments:  Bed Mobility  Bed Mobility: Yes  Bed Mobility 1  Bed Mobility 1: Supine to sitting  Level of Assistance 1: Minimum assistance  Bed Mobility Comments 1: Pt assisted with draw sheet 2/2 pt's report of HA and violeta shoulder pain R>L.  Bed Mobility 2  Bed Mobility  2: Sitting to supine  Bed Mobility Comments 2: SBA    Transfers  Transfer: Yes  Transfer 1  Technique 1: Sit to stand, Stand to sit  Transfer Device 1: Walker  Trials/Comments 1: SBAx1 for STS from EOB.    Ambulation/Gait Training  Ambulation/Gait Training Performed: Yes  Ambulation/Gait Training 1  Comments/Distance (ft) 1: Pt amb x3-4 steps laterally to L with FWW, SBAx1 with shuffled steps. Pt not agreeable to further  mobility 2/2 pain and fatigue.  Extremity/Trunk Assessments:  RLE   RLE : Within Functional Limits  LLE   LLE : Within Functional Limits  Outcome Measures:  [facility] Basic Mobility  Turning from your back to your side while in a flat bed without using bedrails: None  Moving from lying on your back to sitting on the side of a flat bed without using bedrails: A little  Moving to and from bed to chair (including a wheelchair): A little  Standing up from a chair using your arms (e.g. wheelchair or bedside chair): A little  To walk in hospital room: A little  Climbing 3-5 steps with railing: Total  Basic Mobility - Total Score: 17    Encounter Problems       Encounter Problems (Active)       PT Problem       Bed mobility (Progressing)       Start:  05/16/25    Expected End:  05/30/25       Pt will perform supine<>sit with HOB flat, MONSE.           Transfers (Progressing)       Start:  05/16/25    Expected End:  05/30/25       Pt will perform all transfers with LRAD, MONSE.            Gait (Progressing)       Start:  05/16/25    Expected End:  05/30/25       Pt will amb 150' with LRAD, MONSE with reciprocal gait, upright posture and improved activity tolerance as demonstrated by vitals.           Balance (Progressing)       Start:  05/16/25    Expected End:  05/30/25       Pt will tolerate standing x2 min without UE support, SBAx1.            Pain - Adult              Education Documentation  Mobility Training, taught by Sarah Fink, PT at 5/16/2025 12:39 PM.  Learner: Patient  Readiness: Acceptance  Method: Explanation  Response: Verbalizes Understanding, Demonstrated Understanding    Education Comments  No comments found.                 [1]   Past Medical History:  Diagnosis Date    Bipolar affective (Multi)     Falls     Glaucoma     Hemiplegia (Multi)     Hepatitis     Hyperlipidemia     Hypertension     Schizoaffective disorder (Multi)     Schizophrenia     Syphilis     Vitamin D deficiency

## 2025-05-16 NOTE — PROGRESS NOTES
Left vm for pt's daughter Angela to discuss dc planning. Chart reviewed, pt admitted from [facility].     1530 Attempted to reach pt's daughter again, left an additional VM for Angela.

## 2025-05-16 NOTE — CONSULTS
Department of Neurological Surgery  Consult Note      Referral Diagnosis:   1. Traumatic rhabdomyolysis, initial encounter        2. Fall, initial encounter        3. Closed head injury, initial encounter         Closed head injury, initial encounter [S09.90XA]  Fall, initial encounter [W19.XXXA]  Traumatic rhabdomyolysis, initial encounter [T79.6XXA]  Rhabdomyolysis [M62.82]     History Of Present Illness  Cristel Boyd is a 69 y.o. female with PMH significant for dementia, schizoaffective disorder, bipolar disorder, schizophrenia, drug-induced parkinsonism, tardive dyskinesia, frequent falls, hepatitis, glaucoma, depression, and opioid abuse who presented following a mechanical fall at her nursing facility.  When she fell she hit her head and low back.  No LOC.  When she presented to the ER she reported headache, right shoulder pain, and low back pain.  X-rays of the lumbar spine were obtained and do not demonstrate any fracture.  There is evidence of spondylosis and facet disease of the lower lumbar spine, neurosurgery has been consulted for further evaluation.  On exam, patient reports low back pain that is chronic in nature.  She denies any radicular leg pain and denies any paresthesias.     Past Medical History  Medical History[1]    Surgical History  Surgical History[2]    Social History  Social History[3]    Allergies  Lidocaine  Prescriptions Prior to Admission[4]    Review of Systems  14/14 systems reviewed and negative other than what is listed in the history of present illness    Physical Exam    General: Well developed, awake/alert/oriented x3, no distress, alert and cooperative. Speech clear.   Skin: Warm and dry, no lesions, no rashes  ENMT: Mucous membranes dry, no apparent injury, no lesions seen  Head/Neck: Neck Supple, no apparent injury  Respiratory/Thorax: Normal breath sounds with good chest expansion, thorax symmetric  Cardiovascular: No pitting edema, no JVD    Motor Strength: 5/5  "Throughout all extremities    Muscle Bulk: Normal and symmetric in all extremities    Mild tenderness to palpation across the lower lumbar spine    Sensation: intact to light touch throughout    Last Recorded Vitals  Blood pressure 121/72, pulse 95, temperature 36.5 °C (97.7 °F), temperature source Temporal, resp. rate 18, height 1.626 m (5' 4\"), weight 56.7 kg (125 lb), SpO2 92%.    Relevant Results    Results for orders placed or performed during the hospital encounter of 05/15/25 (from the past 96 hours)   Comprehensive metabolic panel   Result Value Ref Range    Glucose 86 74 - 99 mg/dL    Sodium 137 136 - 145 mmol/L    Potassium 3.7 3.5 - 5.3 mmol/L    Chloride 103 98 - 107 mmol/L    Bicarbonate 30 21 - 32 mmol/L    Anion Gap 8 (L) 10 - 20 mmol/L    Urea Nitrogen 22 6 - 23 mg/dL    Creatinine 0.46 (L) 0.50 - 1.05 mg/dL    eGFR >90 >60 mL/min/1.73m*2    Calcium 9.2 8.6 - 10.3 mg/dL    Albumin 3.3 (L) 3.4 - 5.0 g/dL    Alkaline Phosphatase 52 33 - 136 U/L    Total Protein 6.7 6.4 - 8.2 g/dL    AST 54 (H) 9 - 39 U/L    Bilirubin, Total 0.3 0.0 - 1.2 mg/dL    ALT 12 7 - 45 U/L   Creatine Kinase   Result Value Ref Range    Creatine Kinase 2,912 (H) 0 - 215 U/L   CBC and Auto Differential   Result Value Ref Range    WBC 8.4 4.4 - 11.3 x10*3/uL    nRBC 0.0 0.0 - 0.0 /100 WBCs    RBC 3.45 (L) 4.00 - 5.20 x10*6/uL    Hemoglobin 10.5 (L) 12.0 - 16.0 g/dL    Hematocrit 32.3 (L) 36.0 - 46.0 %    MCV 94 80 - 100 fL    MCH 30.4 26.0 - 34.0 pg    MCHC 32.5 32.0 - 36.0 g/dL    RDW 13.6 11.5 - 14.5 %    Platelets 205 150 - 450 x10*3/uL    Neutrophils % 65.7 40.0 - 80.0 %    Immature Granulocytes %, Automated 0.2 0.0 - 0.9 %    Lymphocytes % 20.7 13.0 - 44.0 %    Monocytes % 12.5 2.0 - 10.0 %    Eosinophils % 0.7 0.0 - 6.0 %    Basophils % 0.2 0.0 - 2.0 %    Neutrophils Absolute 5.52 1.20 - 7.70 x10*3/uL    Immature Granulocytes Absolute, Automated 0.02 0.00 - 0.70 x10*3/uL    Lymphocytes Absolute 1.74 1.20 - 4.80 x10*3/uL    " Monocytes Absolute 1.05 (H) 0.10 - 1.00 x10*3/uL    Eosinophils Absolute 0.06 0.00 - 0.70 x10*3/uL    Basophils Absolute 0.02 0.00 - 0.10 x10*3/uL   Acute Toxicology Panel, Blood   Result Value Ref Range    Acetaminophen <10.0 10.0 - 30.0 ug/mL    Salicylate  <3 4 - 20 mg/dL    Alcohol <10 <=10 mg/dL   Creatine Kinase   Result Value Ref Range    Creatine Kinase 2,173 (H) 0 - 215 U/L   Drug Screen, Urine   Result Value Ref Range    Amphetamine Screen, Urine Presumptive Negative Presumptive Negative    Barbiturate Screen, Urine Presumptive Negative Presumptive Negative    Benzodiazepines Screen, Urine Presumptive Negative Presumptive Negative    Cannabinoid Screen, Urine Presumptive Negative Presumptive Negative    Cocaine Metabolite Screen, Urine Presumptive Negative Presumptive Negative    Fentanyl Screen, Urine Presumptive Negative Presumptive Negative    Opiate Screen, Urine Presumptive Negative Presumptive Negative    Oxycodone Screen, Urine Presumptive Negative Presumptive Negative    PCP Screen, Urine Presumptive Negative Presumptive Negative    Methadone Screen, Urine Presumptive Negative Presumptive Negative   Urinalysis with Reflex Culture and Microscopic   Result Value Ref Range    Color, Urine Light-Yellow Light-Yellow, Yellow, Dark-Yellow    Appearance, Urine Clear Clear    Specific Gravity, Urine 1.018 1.005 - 1.035    pH, Urine 6.5 5.0, 5.5, 6.0, 6.5, 7.0, 7.5, 8.0    Protein, Urine NEGATIVE NEGATIVE, 10 (TRACE), 20 (TRACE) mg/dL    Glucose, Urine Normal Normal mg/dL    Blood, Urine NEGATIVE NEGATIVE mg/dL    Ketones, Urine NEGATIVE NEGATIVE mg/dL    Bilirubin, Urine NEGATIVE NEGATIVE mg/dL    Urobilinogen, Urine Normal Normal mg/dL    Nitrite, Urine NEGATIVE NEGATIVE    Leukocyte Esterase, Urine NEGATIVE NEGATIVE   Extra Urine Gray Tube   Result Value Ref Range    Extra Tube Hold for add-ons.    Creatine Kinase   Result Value Ref Range    Creatine Kinase 1,681 (H) 0 - 215 U/L   CBC   Result Value Ref  Range    WBC 5.2 4.4 - 11.3 x10*3/uL    nRBC 0.0 0.0 - 0.0 /100 WBCs    RBC 3.27 (L) 4.00 - 5.20 x10*6/uL    Hemoglobin 9.9 (L) 12.0 - 16.0 g/dL    Hematocrit 31.2 (L) 36.0 - 46.0 %    MCV 95 80 - 100 fL    MCH 30.3 26.0 - 34.0 pg    MCHC 31.7 (L) 32.0 - 36.0 g/dL    RDW 13.7 11.5 - 14.5 %    Platelets 205 150 - 450 x10*3/uL   Basic Metabolic Panel   Result Value Ref Range    Glucose 78 74 - 99 mg/dL    Sodium 139 136 - 145 mmol/L    Potassium 3.6 3.5 - 5.3 mmol/L    Chloride 108 (H) 98 - 107 mmol/L    Bicarbonate 26 21 - 32 mmol/L    Anion Gap 9 (L) 10 - 20 mmol/L    Urea Nitrogen 17 6 - 23 mg/dL    Creatinine 0.48 (L) 0.50 - 1.05 mg/dL    eGFR >90 >60 mL/min/1.73m*2    Calcium 8.5 (L) 8.6 - 10.3 mg/dL   Creatine Kinase   Result Value Ref Range    Creatine Kinase 1,532 (H) 0 - 215 U/L         Intake/Output Summary (Last 24 hours) at 5/16/2025 0914  Last data filed at 5/16/2025 0732  Gross per 24 hour   Intake 3040 ml   Output --   Net 3040 ml         Assessment and Plan     Assessment/Plan     Assessment & Plan  Acute midline thoracic back pain    Imaging studies independently reviewed and interpreted. There is evidence of multilevel degenerative changes, spondylosis and facet disease of the lower lumbar spine on X-rays. Patient reports her low back pain is chronic in nature. There is no motor or sensory deficit on exam. No further imaging studies warranted at this point. Would recommend conservative management with physical therapy, medications and pain management. Can follow up in the OP setting if symptoms persist despite conservative treatment. Neurosurgery will sign off at this time.             Lilia [facility]GIN[facility]OTH[facility]  [id number] [place], [place] [address]dg. [address] [place] [id number]    [address] [place] [address]  [address]  [place] [address]     Phone: [phone]  Fax: [phone]              [1]   Past Medical History:  Diagnosis Date    Bipolar affective (Multi)      Falls     Glaucoma     Hemiplegia (Multi)     Hepatitis     Hyperlipidemia     Hypertension     Schizoaffective disorder (Multi)     Schizophrenia     Syphilis     Vitamin D deficiency    [2] History reviewed. No pertinent surgical history.  [3]   Social History  Tobacco Use    Smoking status: Never    Smokeless tobacco: Never   Substance Use Topics    Alcohol use: Not Currently    Drug use: Never   [4]   Medications Prior to Admission   Medication Sig Dispense Refill Last Dose/Taking    aspirin 81 mg EC tablet Take 1 tablet (81 mg) by mouth once daily.   5/15/2025 Morning    atorvastatin (Lipitor) 80 mg tablet Take 1 tablet (80 mg) by mouth once daily at bedtime.   5/14/2025 Bedtime    brimonidine-timoloL (Combigan) 0.2-0.5 % ophthalmic solution Administer 1 drop into the left eye 2 times a day.   5/15/2025 Morning    cholecalciferol (Vitamin D-3) 25 mcg (1,000 units) capsule Take 1 capsule (25 mcg) by mouth once daily.   5/15/2025 Morning    cyanocobalamin (Vitamin B-12) 1,000 mcg tablet Take 1 tablet (1,000 mcg) by mouth once daily.   5/15/2025 Morning    divalproex (Depakote) 250 mg EC tablet Take 1 tablet (250 mg) by mouth 3 times a day. Do not crush, chew, or split.   5/15/2025 Morning    DULoxetine (Drizalma Sprinkle) 40 mg capsule, delayed rel sprinkle capsule Take 1 capsule (40 mg) by mouth once daily.   5/15/2025 Morning    hydrOXYzine HCL (Atarax) 50 mg tablet Take 1 tablet (50 mg) by mouth once daily at bedtime.   5/14/2025 Bedtime    meloxicam (Mobic) 7.5 mg tablet Take 1 tablet (7.5 mg) by mouth once daily.   5/15/2025 Morning    mirtazapine (Remeron) 7.5 mg tablet Take 1 tablet (7.5 mg) by mouth once daily at bedtime.   5/14/2025 Bedtime    traZODone (Desyrel) 50 mg tablet Take 1 tablet (50 mg) by mouth as needed at bedtime for sleep.

## 2025-05-16 NOTE — CARE PLAN
The patient's goals for the shift include n/a.    The clinical goals for the shift include Patient will remain free from falls through end of shift.    Patient remained free from falls this shift and IV fluids continued.

## 2025-05-17 ENCOUNTER — APPOINTMENT (OUTPATIENT)
Dept: RADIOLOGY | Facility: HOSPITAL | Age: 70
DRG: 565 | End: 2025-05-17
Payer: COMMERCIAL

## 2025-05-17 VITALS
HEIGHT: 64 IN | SYSTOLIC BLOOD PRESSURE: 124 MMHG | RESPIRATION RATE: 18 BRPM | WEIGHT: 125 LBS | OXYGEN SATURATION: 96 % | BODY MASS INDEX: 21.34 KG/M2 | TEMPERATURE: 97.9 F | HEART RATE: 80 BPM | DIASTOLIC BLOOD PRESSURE: 77 MMHG

## 2025-05-17 PROBLEM — R29.6 UNWITNESSED FALL: Status: ACTIVE | Noted: 2025-05-17

## 2025-05-17 LAB
ANION GAP SERPL CALC-SCNC: 9 MMOL/L (ref 10–20)
BUN SERPL-MCNC: 14 MG/DL (ref 6–23)
CALCIUM SERPL-MCNC: 9.6 MG/DL (ref 8.6–10.3)
CHLORIDE SERPL-SCNC: 105 MMOL/L (ref 98–107)
CK SERPL-CCNC: 951 U/L (ref 0–215)
CO2 SERPL-SCNC: 29 MMOL/L (ref 21–32)
CREAT SERPL-MCNC: 0.42 MG/DL (ref 0.5–1.05)
EGFRCR SERPLBLD CKD-EPI 2021: >90 ML/MIN/1.73M*2
ERYTHROCYTE [DISTWIDTH] IN BLOOD BY AUTOMATED COUNT: 13.4 % (ref 11.5–14.5)
GLUCOSE SERPL-MCNC: 83 MG/DL (ref 74–99)
HCT VFR BLD AUTO: 34.6 % (ref 36–46)
HGB BLD-MCNC: 11.4 G/DL (ref 12–16)
MCH RBC QN AUTO: 30.6 PG (ref 26–34)
MCHC RBC AUTO-ENTMCNC: 32.9 G/DL (ref 32–36)
MCV RBC AUTO: 93 FL (ref 80–100)
NRBC BLD-RTO: 0 /100 WBCS (ref 0–0)
PLATELET # BLD AUTO: 244 X10*3/UL (ref 150–450)
POTASSIUM SERPL-SCNC: 3.7 MMOL/L (ref 3.5–5.3)
RBC # BLD AUTO: 3.72 X10*6/UL (ref 4–5.2)
SODIUM SERPL-SCNC: 139 MMOL/L (ref 136–145)
WBC # BLD AUTO: 6.2 X10*3/UL (ref 4.4–11.3)

## 2025-05-17 PROCEDURE — 36415 COLL VENOUS BLD VENIPUNCTURE: CPT | Performed by: NURSE PRACTITIONER

## 2025-05-17 PROCEDURE — 2500000001 HC RX 250 WO HCPCS SELF ADMINISTERED DRUGS (ALT 637 FOR MEDICARE OP): Performed by: NURSE PRACTITIONER

## 2025-05-17 PROCEDURE — 71045 X-RAY EXAM CHEST 1 VIEW: CPT

## 2025-05-17 PROCEDURE — 71045 X-RAY EXAM CHEST 1 VIEW: CPT | Performed by: STUDENT IN AN ORGANIZED HEALTH CARE EDUCATION/TRAINING PROGRAM

## 2025-05-17 PROCEDURE — 85027 COMPLETE CBC AUTOMATED: CPT | Performed by: NURSE PRACTITIONER

## 2025-05-17 PROCEDURE — 2500000002 HC RX 250 W HCPCS SELF ADMINISTERED DRUGS (ALT 637 FOR MEDICARE OP, ALT 636 FOR OP/ED): Performed by: NURSE PRACTITIONER

## 2025-05-17 PROCEDURE — 82550 ASSAY OF CK (CPK): CPT | Performed by: NURSE PRACTITIONER

## 2025-05-17 PROCEDURE — 80048 BASIC METABOLIC PNL TOTAL CA: CPT | Performed by: NURSE PRACTITIONER

## 2025-05-17 RX ORDER — DIPHENHYDRAMINE HYDROCHLORIDE 50 MG/ML
25 INJECTION, SOLUTION INTRAMUSCULAR; INTRAVENOUS ONCE
Status: DISCONTINUED | OUTPATIENT
Start: 2025-05-17 | End: 2025-05-17 | Stop reason: HOSPADM

## 2025-05-17 RX ORDER — GUAIFENESIN/DEXTROMETHORPHAN 100-10MG/5
10 SYRUP ORAL EVERY 4 HOURS PRN
Status: DISCONTINUED | OUTPATIENT
Start: 2025-05-17 | End: 2025-05-17 | Stop reason: HOSPADM

## 2025-05-17 RX ORDER — POTASSIUM CHLORIDE 20 MEQ/1
20 TABLET, EXTENDED RELEASE ORAL ONCE
Status: COMPLETED | OUTPATIENT
Start: 2025-05-17 | End: 2025-05-17

## 2025-05-17 RX ADMIN — BRIMONIDINE TARTRATE 1 DROP: 2 SOLUTION/ DROPS OPHTHALMIC at 08:14

## 2025-05-17 RX ADMIN — DULOXETINE HYDROCHLORIDE 40 MG: 20 CAPSULE, DELAYED RELEASE ORAL at 08:14

## 2025-05-17 RX ADMIN — TIMOLOL MALEATE 1 DROP: 5 SOLUTION/ DROPS OPHTHALMIC at 08:14

## 2025-05-17 RX ADMIN — DIVALPROEX SODIUM 250 MG: 250 TABLET, DELAYED RELEASE ORAL at 08:13

## 2025-05-17 RX ADMIN — ASPIRIN 81 MG: 81 TABLET, COATED ORAL at 08:14

## 2025-05-17 RX ADMIN — MELOXICAM 7.5 MG: 7.5 TABLET ORAL at 08:14

## 2025-05-17 RX ADMIN — POTASSIUM CHLORIDE 20 MEQ: 1500 TABLET, EXTENDED RELEASE ORAL at 13:17

## 2025-05-17 ASSESSMENT — COGNITIVE AND FUNCTIONAL STATUS - GENERAL
PERSONAL GROOMING: A LITTLE
TOILETING: A LITTLE
HELP NEEDED FOR BATHING: A LITTLE
DRESSING REGULAR LOWER BODY CLOTHING: A LITTLE
TURNING FROM BACK TO SIDE WHILE IN FLAT BAD: A LITTLE
STANDING UP FROM CHAIR USING ARMS: A LITTLE
MOVING TO AND FROM BED TO CHAIR: A LITTLE
DAILY ACTIVITIY SCORE: 19
DRESSING REGULAR UPPER BODY CLOTHING: A LITTLE
CLIMB 3 TO 5 STEPS WITH RAILING: A LOT

## 2025-05-17 ASSESSMENT — ENCOUNTER SYMPTOMS
WEAKNESS: 1
GASTROINTESTINAL NEGATIVE: 1
RESPIRATORY NEGATIVE: 1
AGITATION: 1
CARDIOVASCULAR NEGATIVE: 1
FATIGUE: 1
MUSCULOSKELETAL NEGATIVE: 1

## 2025-05-17 ASSESSMENT — PAIN SCALES - GENERAL: PAINLEVEL_OUTOF10: 0 - NO PAIN

## 2025-05-17 NOTE — CARE PLAN
The patient's goals for the shift include      The clinical goals for the shift include free on falls    Problem: Fall/Injury  Goal: Not fall by end of shift  Outcome: Progressing  Goal: Be free from injury by end of the shift  Outcome: Progressing  Goal: Verbalize understanding of personal risk factors for fall in the hospital  Outcome: Progressing  Goal: Verbalize understanding of risk factor reduction measures to prevent injury from fall in the home  Outcome: Progressing  Goal: Use assistive devices by end of the shift  Outcome: Progressing  Goal: Pace activities to prevent fatigue by end of the shift  Outcome: Progressing     Problem: Pain  Goal: Takes deep breaths with improved pain control throughout the shift  Outcome: Progressing  Goal: Turns in bed with improved pain control throughout the shift  Outcome: Progressing  Goal: Walks with improved pain control throughout the shift  Outcome: Progressing  Goal: Performs ADL's with improved pain control throughout shift  Outcome: Progressing  Goal: Participates in PT with improved pain control throughout the shift  Outcome: Progressing  Goal: Free from opioid side effects throughout the shift  Outcome: Progressing  Goal: Free from acute confusion related to pain meds throughout the shift  Outcome: Progressing     Problem: Pain - Adult  Goal: Verbalizes/displays adequate comfort level or baseline comfort level  Outcome: Progressing     Problem: Safety - Adult  Goal: Free from fall injury  Outcome: Progressing     Problem: Nutrition  Goal: Nutrient intake appropriate for maintaining nutritional needs  Outcome: Progressing

## 2025-05-17 NOTE — CARE PLAN
The patient's goals for the shift include      The clinical goals for the shift include PT.WILL BE SEEN BY PCP/CONSULTS, RECIEVE PRESCRIBED MEDS/TX'S.

## 2025-05-17 NOTE — PROGRESS NOTES
Cristel Boyd is a 69 y.o. female on day 1 of admission presenting with Rhabdomyolysis.      Subjective   Patient is feeling well, no N/V, no CP or SOB.          Objective          Vitals 24HR  Heart Rate:  [71-78]   Temp:  [36.6 °C (97.9 °F)-37.2 °C (99 °F)]   Resp:  [17-18]   BP: (121-138)/(58-76)   SpO2:  [91 %-96 %]     Intake/Output last 3 Shifts:    Intake/Output Summary (Last 24 hours) at 5/17/2025 1344  Last data filed at 5/17/2025 0900  Gross per 24 hour   Intake 1460 ml   Output 600 ml   Net 860 ml       Physical Exam  GENERAL: No distress.   SKIN: No rashes or lesions.  EYES: PERRLA, EOMI  HEENT: Head: Normocephalic  Neck: supple and no adenopathy  LUNGS: Lungs clear to auscultation.   CARDIAC: Normal S1 and S2; no murmurs.   ABDOMEN: Soft, non-tender.   EXTREMITIES: No ulcers, Extremities normal.   NEURO: No focal deficit.     Relevant Results                    Scheduled medications  Scheduled Medications[1]  Continuous medications  Continuous Medications[2]  PRN medications  PRN Medications[3]  Results for orders placed or performed during the hospital encounter of 05/15/25 (from the past 24 hours)   Creatine Kinase   Result Value Ref Range    Creatine Kinase 1,110 (H) 0 - 215 U/L   CBC   Result Value Ref Range    WBC 6.2 4.4 - 11.3 x10*3/uL    nRBC 0.0 0.0 - 0.0 /100 WBCs    RBC 3.72 (L) 4.00 - 5.20 x10*6/uL    Hemoglobin 11.4 (L) 12.0 - 16.0 g/dL    Hematocrit 34.6 (L) 36.0 - 46.0 %    MCV 93 80 - 100 fL    MCH 30.6 26.0 - 34.0 pg    MCHC 32.9 32.0 - 36.0 g/dL    RDW 13.4 11.5 - 14.5 %    Platelets 244 150 - 450 x10*3/uL   Basic Metabolic Panel   Result Value Ref Range    Glucose 83 74 - 99 mg/dL    Sodium 139 136 - 145 mmol/L    Potassium 3.7 3.5 - 5.3 mmol/L    [place] 105 98 - 107 mmol/L    Bicarbonate 29 21 - 32 mmol/L    Anion Gap 9 (L) 10 - 20 mmol/L    Urea Nitrogen 14 6 - 23 mg/dL    Creatinine 0.42 (L) 0.50 - 1.05 mg/dL    eGFR >90 >60 mL/min/1.73m*2    Calcium 9.6 8.6 - 10.3 mg/dL    Creatine Kinase   Result Value Ref Range    Creatine Kinase 951 (H) 0 - 215 U/L           Assessment & Plan  Rhabdomyolysis    Acute midline thoracic back pain    Right arm pain    Traumatic rhabdomyolysis, initial encounter       69 y.o. female   PMH; hypertension, hyperlipidemia, hemiplegia right side, tardive dyskinesia, schizoaffective, bipolar, schizophrenia, syphilis, falls, vitamin D deficiency, hepatitis.    Patient is presenting to [facility] on 5/15/2025 from [facility] with fall.  Patient tells me she went to get up and slid on her footies, patient states she hit her head and hurt her lower back.  Patient tells me she could not get up and was laying on the floor for several hours due to pain.  Patient denies loss of consciousness.  Patient denies dizziness, vision changes, sweats, chills, chest pain, palpitations, shortness of breath, abdominal pain, melena or hematochezia, urinary symptoms.     // Rhabdomyolysis  // Acute midline thoracic back pain  // Right arm pain  Xray lumbar; Severe spondylosis. No fracture seen  Xrays right arm negative for acute process  Fall precautions  PT/OT eval and tx  Baseline EKG ordered  Cardiac diet      CPK is improving.  Okay to discharge from the medical standpoint.    Gama Dela Cruz MD           [1] acetaminophen, 975 mg, oral, Once  aspirin, 81 mg, oral, Daily  [Held by provider] atorvastatin, 80 mg, oral, Nightly  brimonidine, 1 drop, Left Eye, BID  diphenhydrAMINE, 25 mg, intravenous, Once  divalproex, 250 mg, oral, TID  DULoxetine, 40 mg, oral, Daily  [Held by provider] hydrOXYzine HCL, 50 mg, oral, Nightly  meloxicam, 7.5 mg, oral, Daily  [Held by provider] mirtazapine, 7.5 mg, oral, Nightly  polyethylene glycol, 17 g, oral, Daily  timolol, 1 drop, Left Eye, BID     [2]    [3] PRN medications: acetaminophen **OR** acetaminophen **OR** acetaminophen, dextromethorphan-guaifenesin, [Held by provider] traZODone

## 2025-05-17 NOTE — PROGRESS NOTES
Notified by nursing and CNP that pt was ready for dc. Confirmed with [facility] that pt is long term care and can return. DSC (Wanda) scheduled pt for 2:30pm pickup and notified [facility].   Voicemail left for pt's dtr (Angela).  SHANTE Cartagena

## 2025-05-17 NOTE — NURSING NOTE
Informed patient, that she does not have IVF infusing she need to increase her oral intake. Pt. Agrees.

## 2025-05-17 NOTE — NURSING NOTE
Pt. Started coughing, request cough medicine. Frances Schultz/[facility] was messages via secure cht. Said she will order CXR first to make sure she ishaving fluid overload. Isaías WAS CALLED, FOR STAT CXR 1 VIEW ON THIS PATIENT.

## 2025-05-17 NOTE — NURSING NOTE
Pt. Anxious right now, wont stay in bed, claims something is going to happen. Angel Abrams/DAVID   was notified via secure chat.

## 2025-05-17 NOTE — PROGRESS NOTES
Subjective   Patient ID: Cristel Boyd is a 69 y.o. female.    Pt seen and examined at bedside and on sick visit. Overnight, did have a fall out of bed. Then unfortuantely she did hit her head on the door. This was unwitnessed so unsure how hard she hit her head. She is unable to provide any history at this time or history of the event. Currently neurologically stable however does endorse a headche. Otherwise states no additional issues or concerns.         Review of Systems   Constitutional:  Positive for fatigue.   HENT: Negative.     Respiratory: Negative.     Cardiovascular: Negative.    Gastrointestinal: Negative.    Musculoskeletal: Negative.    Neurological:  Positive for weakness.   Psychiatric/Behavioral:  Positive for agitation and behavioral problems.        Objective Vitals Reviewed via facility EMR   Physical Exam  Constitutional:       General: She is not in acute distress.     Appearance: She is not ill-appearing.      Comments: Slight bruising, agitated intermittently   Eyes:      Pupils: Pupils are equal, round, and reactive to light.   Cardiovascular:      Rate and Rhythm: Normal rate and regular rhythm.      Pulses: Normal pulses.      Heart sounds: No murmur heard.  Pulmonary:      Effort: No respiratory distress.      Breath sounds: No wheezing.   Abdominal:      General: Abdomen is flat. Bowel sounds are normal. There is no distension.   Musculoskeletal:      Right lower leg: No edema.      Left lower leg: No edema.   Skin:     General: Skin is warm and dry.   Neurological:      Mental Status: She is alert. Mental status is at baseline.      Cranial Nerves: No cranial nerve deficit.      Motor: Weakness present.   Psychiatric:         Mood and Affect: Mood normal.         Behavior: Behavior normal.         Assessment/Plan   Diagnoses and all orders for this visit:  Primary hypertension  Spastic hemiplegia of right dominant side due to noncerebrovascular etiology (Multi)  Other chronic  pain  Other schizophrenia  Unwitnessed fall      PT seen and examined, neurologically intact, however unclear of situation of fall. She does endorse some headaches as well. Patient likely does need imaging to rule out additional injury due to unwitnessed fall. Recommend neurochecks per protocol and recommend eval with CT imaging and x-rays. Likely will need to be sent to ED due to acuity. Otherwise follow up on results, continue supportive care, discussed with staff.     Reviewed and approved by AUSTIN MURRAY on 5/17/25 at 2:12 PM.

## 2025-05-17 NOTE — NURSING NOTE
RICK HOPKINS WAS NOTIFIED THAT PT. IS REFUSING IV RESTAT , 2 RN ATTEMPTED , OFFERED TO TRY ONE MORE TIME SHE NO. TOMORROW.MADE AWARE SHE IS ON TELE,REQUESTING SLEEPING PILL TOO.

## 2025-05-22 ENCOUNTER — NURSING HOME VISIT (OUTPATIENT)
Dept: POST ACUTE CARE | Facility: EXTERNAL LOCATION | Age: 70
End: 2025-05-22
Payer: COMMERCIAL

## 2025-05-22 DIAGNOSIS — G81.11 SPASTIC HEMIPLEGIA OF RIGHT DOMINANT SIDE DUE TO NONCEREBROVASCULAR ETIOLOGY (MULTI): ICD-10-CM

## 2025-05-22 DIAGNOSIS — I10 PRIMARY HYPERTENSION: Primary | ICD-10-CM

## 2025-05-22 DIAGNOSIS — F20.89 OTHER SCHIZOPHRENIA: ICD-10-CM

## 2025-05-22 DIAGNOSIS — T79.6XXA TRAUMATIC RHABDOMYOLYSIS, INITIAL ENCOUNTER: ICD-10-CM

## 2025-05-22 PROCEDURE — 99309 SBSQ NF CARE MODERATE MDM 30: CPT | Performed by: STUDENT IN AN ORGANIZED HEALTH CARE EDUCATION/TRAINING PROGRAM

## 2025-05-22 ASSESSMENT — ENCOUNTER SYMPTOMS
RESPIRATORY NEGATIVE: 1
CARDIOVASCULAR NEGATIVE: 1
GASTROINTESTINAL NEGATIVE: 1
MUSCULOSKELETAL NEGATIVE: 1
PSYCHIATRIC NEGATIVE: 1
WEAKNESS: 1
FATIGUE: 1

## 2025-05-23 NOTE — PROGRESS NOTES
Subjective   Patient ID: Cristel Boyd is a 69 y.o. female.    Patient seen and examined on routine follow-up and hospital discharge.  Recently was admitted to the hospital status post fall and was found to have rhabdomyolysis and GHADA secondary to altered mental status.  Overall improved with supportive care, no injuries noted, and was discharged back to SNF in stable condition.  Otherwise, she regards no acute issues or concerns.  Mentation is back at baseline.  States no recent falls.         Review of Systems   Constitutional:  Positive for fatigue.   HENT: Negative.     Respiratory: Negative.     Cardiovascular: Negative.    Gastrointestinal: Negative.    Musculoskeletal: Negative.    Neurological:  Positive for weakness.   Psychiatric/Behavioral: Negative.         Objective Vitals Reviewed via facility EMR   Physical Exam  Constitutional:       General: She is not in acute distress.     Appearance: She is not ill-appearing.   Eyes:      Pupils: Pupils are equal, round, and reactive to light.   Cardiovascular:      Rate and Rhythm: Normal rate and regular rhythm.      Pulses: Normal pulses.      Heart sounds: No murmur heard.  Pulmonary:      Effort: No respiratory distress.      Breath sounds: No wheezing.   Abdominal:      General: Abdomen is flat. Bowel sounds are normal. There is no distension.   Musculoskeletal:      Right lower leg: No edema.      Left lower leg: No edema.   Skin:     General: Skin is warm and dry.   Neurological:      Mental Status: She is alert. Mental status is at baseline.      Cranial Nerves: No cranial nerve deficit.      Motor: Weakness present.   Psychiatric:         Mood and Affect: Mood normal.         Behavior: Behavior normal.         Assessment/Plan   Diagnoses and all orders for this visit:  Primary hypertension  Other schizophrenia  Spastic hemiplegia of right dominant side due to noncerebrovascular etiology (Multi)  Traumatic rhabdomyolysis, initial  encounter      Patient seen and examined at bedside.  Hospital course reviewed medications reviewed and reconciled avoid nephrotoxins fall precautions.  Continue supportive care.  No additional issues or concerns at this time.  Will obtain follow-up blood work from the hospital CBC BMP.  No additional issues.     Reviewed and approved by AUSTIN MURRAY on 5/22/25 at 10:59 PM.

## 2025-06-02 ENCOUNTER — HOSPITAL ENCOUNTER (EMERGENCY)
Facility: HOSPITAL | Age: 70
Discharge: HOME | End: 2025-06-03
Payer: COMMERCIAL

## 2025-06-02 ENCOUNTER — APPOINTMENT (OUTPATIENT)
Dept: RADIOLOGY | Facility: HOSPITAL | Age: 70
End: 2025-06-02
Payer: COMMERCIAL

## 2025-06-02 ENCOUNTER — APPOINTMENT (OUTPATIENT)
Dept: CARDIOLOGY | Facility: HOSPITAL | Age: 70
End: 2025-06-02
Payer: COMMERCIAL

## 2025-06-02 DIAGNOSIS — Z13.9 ENCOUNTER FOR MEDICAL SCREENING EXAMINATION: Primary | ICD-10-CM

## 2025-06-02 LAB
ALBUMIN SERPL BCP-MCNC: 3.6 G/DL (ref 3.4–5)
ALP SERPL-CCNC: 74 U/L (ref 33–136)
ALT SERPL W P-5'-P-CCNC: 9 U/L (ref 7–45)
ANION GAP SERPL CALC-SCNC: 8 MMOL/L (ref 10–20)
APPEARANCE UR: CLEAR
AST SERPL W P-5'-P-CCNC: 22 U/L (ref 9–39)
BASOPHILS # BLD AUTO: 0.04 X10*3/UL (ref 0–0.1)
BASOPHILS NFR BLD AUTO: 0.6 %
BILIRUB SERPL-MCNC: 0.3 MG/DL (ref 0–1.2)
BILIRUB UR STRIP.AUTO-MCNC: NEGATIVE MG/DL
BUN SERPL-MCNC: 24 MG/DL (ref 6–23)
CALCIUM SERPL-MCNC: 10.1 MG/DL (ref 8.6–10.3)
CHLORIDE SERPL-SCNC: 104 MMOL/L (ref 98–107)
CO2 SERPL-SCNC: 28 MMOL/L (ref 21–32)
COLOR UR: NORMAL
CREAT SERPL-MCNC: 0.43 MG/DL (ref 0.5–1.05)
EGFRCR SERPLBLD CKD-EPI 2021: >90 ML/MIN/1.73M*2
EOSINOPHIL # BLD AUTO: 0.13 X10*3/UL (ref 0–0.7)
EOSINOPHIL NFR BLD AUTO: 2 %
ERYTHROCYTE [DISTWIDTH] IN BLOOD BY AUTOMATED COUNT: 13.7 % (ref 11.5–14.5)
GLUCOSE SERPL-MCNC: 92 MG/DL (ref 74–99)
GLUCOSE UR STRIP.AUTO-MCNC: NORMAL MG/DL
HCT VFR BLD AUTO: 36.6 % (ref 36–46)
HGB BLD-MCNC: 11.9 G/DL (ref 12–16)
IMM GRANULOCYTES # BLD AUTO: 0.01 X10*3/UL (ref 0–0.7)
IMM GRANULOCYTES NFR BLD AUTO: 0.2 % (ref 0–0.9)
KETONES UR STRIP.AUTO-MCNC: NEGATIVE MG/DL
LEUKOCYTE ESTERASE UR QL STRIP.AUTO: NEGATIVE
LYMPHOCYTES # BLD AUTO: 1.62 X10*3/UL (ref 1.2–4.8)
LYMPHOCYTES NFR BLD AUTO: 25.2 %
MCH RBC QN AUTO: 30.7 PG (ref 26–34)
MCHC RBC AUTO-ENTMCNC: 32.5 G/DL (ref 32–36)
MCV RBC AUTO: 95 FL (ref 80–100)
MONOCYTES # BLD AUTO: 0.67 X10*3/UL (ref 0.1–1)
MONOCYTES NFR BLD AUTO: 10.4 %
NEUTROPHILS # BLD AUTO: 3.97 X10*3/UL (ref 1.2–7.7)
NEUTROPHILS NFR BLD AUTO: 61.6 %
NITRITE UR QL STRIP.AUTO: NEGATIVE
NRBC BLD-RTO: 0 /100 WBCS (ref 0–0)
PH UR STRIP.AUTO: 5.5 [PH]
PLATELET # BLD AUTO: 321 X10*3/UL (ref 150–450)
POTASSIUM SERPL-SCNC: 4.4 MMOL/L (ref 3.5–5.3)
PROT SERPL-MCNC: 7.5 G/DL (ref 6.4–8.2)
PROT UR STRIP.AUTO-MCNC: NEGATIVE MG/DL
RBC # BLD AUTO: 3.87 X10*6/UL (ref 4–5.2)
RBC # UR STRIP.AUTO: NEGATIVE MG/DL
SODIUM SERPL-SCNC: 136 MMOL/L (ref 136–145)
SP GR UR STRIP.AUTO: 1.02
UROBILINOGEN UR STRIP.AUTO-MCNC: NORMAL MG/DL
VALPROATE SERPL-MCNC: 22 UG/ML (ref 50–100)
WBC # BLD AUTO: 6.4 X10*3/UL (ref 4.4–11.3)

## 2025-06-02 PROCEDURE — 93005 ELECTROCARDIOGRAM TRACING: CPT

## 2025-06-02 PROCEDURE — 81003 URINALYSIS AUTO W/O SCOPE: CPT | Performed by: PHYSICIAN ASSISTANT

## 2025-06-02 PROCEDURE — 80053 COMPREHEN METABOLIC PANEL: CPT | Performed by: PHYSICIAN ASSISTANT

## 2025-06-02 PROCEDURE — 80164 ASSAY DIPROPYLACETIC ACD TOT: CPT | Performed by: PHYSICIAN ASSISTANT

## 2025-06-02 PROCEDURE — P9612 CATHETERIZE FOR URINE SPEC: HCPCS

## 2025-06-02 PROCEDURE — 71045 X-RAY EXAM CHEST 1 VIEW: CPT | Performed by: RADIOLOGY

## 2025-06-02 PROCEDURE — 71045 X-RAY EXAM CHEST 1 VIEW: CPT

## 2025-06-02 PROCEDURE — 85025 COMPLETE CBC W/AUTO DIFF WBC: CPT | Performed by: PHYSICIAN ASSISTANT

## 2025-06-02 PROCEDURE — 36415 COLL VENOUS BLD VENIPUNCTURE: CPT | Performed by: PHYSICIAN ASSISTANT

## 2025-06-02 PROCEDURE — 99285 EMERGENCY DEPT VISIT HI MDM: CPT | Mod: 25

## 2025-06-02 PROCEDURE — 2500000001 HC RX 250 WO HCPCS SELF ADMINISTERED DRUGS (ALT 637 FOR MEDICARE OP): Performed by: PHYSICIAN ASSISTANT

## 2025-06-02 RX ORDER — ACETAMINOPHEN 325 MG/1
975 TABLET ORAL ONCE
Status: COMPLETED | OUTPATIENT
Start: 2025-06-02 | End: 2025-06-02

## 2025-06-02 RX ADMIN — ACETAMINOPHEN 975 MG: 325 TABLET ORAL at 16:38

## 2025-06-02 NOTE — ED PROVIDER NOTES
"Limitations to History: none  External Records Reviewed  Independent Historians: self, ems  Social determinants affecting care: none    HPI  Cristel Boyd is a 69 y.o. female who presents emergency department for psychiatric evaluation.  She presents from skilled nursing facility.  She has not been cooperative with them and has not been taking her medications so they sent her in for psychiatric evaluation.  They were also concerned about increased confusion.  During my assessment, she is alert and oriented x 3.  She has no concerns besides not wanting to go back to her skilled nursing facility.  She would prefer to be changed to somewhere else.  She denies any pain.  She denies any suicidal ideation or homicidal ideation.    [facility]  Medical History[1] reviewed by myself.    Meds  Current Outpatient Medications   Medication Instructions    aspirin 81 mg, Daily    atorvastatin (LIPITOR) 80 mg, Nightly    brimonidine-timoloL (Combigan) 0.2-0.5 % ophthalmic solution 1 drop, 2 times daily    cholecalciferol (VITAMIN D-3) 25 mcg, Daily    cyanocobalamin (VITAMIN B-12) 1,000 mcg, Daily    divalproex (DEPAKOTE) 250 mg, 3 times daily    DULoxetine (DRIZALMA SPRINKLE) 40 mg, Daily    hydrOXYzine HCL (ATARAX) 50 mg, Nightly    meloxicam (MOBIC) 7.5 mg, Daily    mirtazapine (REMERON) 7.5 mg, Nightly    traZODone (DESYREL) 50 mg, oral, Nightly PRN       Allergies  RX Allergies[2] reviewed by myself.    SHx  Social History[3] reviewed by myself.      ------------------------------------------------------------------------------------------------------------------------------------------    /84   Pulse 82   Temp 36.3 °C (97.3 °F)   Resp 18   Ht 1.575 m (5' 2\")   Wt 56.7 kg (125 lb)   SpO2 94%   BMI 22.86 kg/m²     Physical Exam  Constitutional:       Appearance: Normal appearance. She is normal weight.   HENT:      Head: Normocephalic.      Nose: Nose normal.      Mouth/Throat:      Mouth: Mucous membranes are moist. "   Eyes:      Extraocular Movements: Extraocular movements intact.      Conjunctiva/sclera: Conjunctivae normal.   Cardiovascular:      Rate and Rhythm: Normal rate and regular rhythm.   Pulmonary:      Effort: Pulmonary effort is normal.      Breath sounds: Normal breath sounds.   Abdominal:      General: Abdomen is flat.      Palpations: Abdomen is soft.      Tenderness: There is no abdominal tenderness.   Musculoskeletal:         General: Normal range of motion.      Cervical back: Neck supple.   Skin:     General: Skin is warm and dry.   Neurological:      General: No focal deficit present.      Mental Status: She is alert and oriented to person, place, and time.   Psychiatric:         Attention and Perception: Attention normal.         Mood and Affect: Mood normal.          ------------------------------------------------------------------------------------------------------------------------------------------  Labs  Labs Reviewed   CBC WITH AUTO DIFFERENTIAL - Abnormal       Result Value    WBC 6.4      nRBC 0.0      RBC 3.87 (*)     Hemoglobin 11.9 (*)     Hematocrit 36.6      MCV 95      MCH 30.7      MCHC 32.5      RDW 13.7      Platelets 321      Neutrophils % 61.6      Immature Granulocytes %, Automated 0.2      Lymphocytes % 25.2      Monocytes % 10.4      Eosinophils % 2.0      Basophils % 0.6      Neutrophils Absolute 3.97      Immature Granulocytes Absolute, Automated 0.01      Lymphocytes Absolute 1.62      Monocytes Absolute 0.67      Eosinophils Absolute 0.13      Basophils Absolute 0.04     COMPREHENSIVE METABOLIC PANEL - Abnormal    Glucose 92      Sodium 136      Potassium 4.4      [place]ide 104      Bicarbonate 28      Anion Gap 8 (*)     Urea Nitrogen 24 (*)     Creatinine 0.43 (*)     eGFR >90      Calcium 10.1      Albumin 3.6      Alkaline Phosphatase 74      Total Protein 7.5      AST 22      Bilirubin, Total 0.3      ALT 9     VALPROIC ACID - Abnormal    Valproic Acid 22 (*)    URINALYSIS  WITH REFLEX CULTURE AND MICROSCOPIC - Normal    Color, Urine Light-Yellow      Appearance, Urine Clear      Specific Gravity, Urine 1.024      pH, Urine 5.5      Protein, Urine NEGATIVE      Glucose, Urine Normal      Blood, Urine NEGATIVE      Ketones, Urine NEGATIVE      Bilirubin, Urine NEGATIVE      Urobilinogen, Urine Normal      Nitrite, Urine NEGATIVE      Leukocyte Esterase, Urine NEGATIVE     URINALYSIS WITH REFLEX CULTURE AND MICROSCOPIC    Narrative:     The following orders were created for panel order Urinalysis with Reflex Culture and Microscopic.  Procedure                               Abnormality         Status                     ---------                               -----------         ------                     Urinalysis with Reflex C...[[id number]]  Normal              Final result               Extra Urine Gray Tube[[id number]]                            In process                   Please view results for these tests on the individual orders.   EXTRA URINE GRAY TUBE        Imaging  XR chest 1 view   Final Result   1.  No evidence of acute cardiopulmonary process.                  MACRO:   None        Signed by: Nikhil Lucas 6/2/2025 2:10 PM   Dictation workstation:   [id number]           ED Course  Diagnoses as of 06/02/25 1706   Encounter for medical screening examination        Medical Decision Making: She did not appear ill or toxic.  She does not currently have psychosis.  She denies any SI or HI.  She currently does not meet any inpatient psychiatric criteria for admission.  Will perform labs and imaging for the reported confusion today.  She is not confused during my assessment.  Her skilled nursing facility is in [place].  I did try to get in contact with the nurses that are taking care of him however unsuccessful.      I did speak with the social work who reports that if her workup is unremarkable and there is no reason for admission, she can be discharged back to her skilled facility  and have social work worked with her placement.    Differential diagnoses considered: UTI, pneumonia, medication noncompliance, others    EKG interpreted by myself and ED attending: Normal sinus rhythm.  Ventricular rate 78 bpm.  No acute ST elevations or depressions.    I reviewed the labs from today.  No leukocytosis or leukopenia.  Hemoglobin 1.9 with hematocrit 36.6.  Normal platelets.  This is about her baseline.  BUN 24 with a creatinine 0.43.  No significant electrolyte abnormalities.  Depakote level is low.  Chest x-ray showing no acute cardiopulmonary process.  I did consult her PCP.  I spoke Dr. Melgar he reports that today she was sent in because she was being more aggressive with staff and asked why they wanted to have her assessed.  He is agreeable with her being discharged back to skilled nursing facility.  She has been calm and cooperative during the entire ER assessment.  She has not been aggressive with nursing staff.  I again I do not believe she needs emergent EPAT evaluation.  She will be discharged back to skilled nursing facility.    Diagnosis: Medical screening exam   Plan: discharge       [1]   Past Medical History:  Diagnosis Date    Bipolar affective (Multi)     Falls     Glaucoma     Hemiplegia (Multi)     Hepatitis     Hyperlipidemia     Hypertension     Schizoaffective disorder (Multi)     Schizophrenia     Syphilis     Vitamin D deficiency    [2]   Allergies  Allergen Reactions    Lidocaine Unknown   [3]   Social History  Tobacco Use    Smoking status: Never    Smokeless tobacco: Never   Substance Use Topics    Alcohol use: Not Currently    Drug use: Never        Lee Darling PA-C  06/02/25 [id number]

## 2025-06-02 NOTE — ED TRIAGE NOTES
Patient BIBA from [facility] in [facility]. EMS states patient no longer likes the facility she at, and requested transfer to [facility]. EMS also report that staff states she has been noncompliant with medications and experiencing paranoia. On arrival patient denies SI and HI. Has been pleasant and cooperative. Patient has a PMH Dementia, Anxiety, MDD, Schizophrenia.

## 2025-06-03 VITALS
RESPIRATION RATE: 16 BRPM | DIASTOLIC BLOOD PRESSURE: 65 MMHG | HEIGHT: 62 IN | BODY MASS INDEX: 23 KG/M2 | SYSTOLIC BLOOD PRESSURE: 116 MMHG | OXYGEN SATURATION: 95 % | HEART RATE: 74 BPM | WEIGHT: 125 LBS | TEMPERATURE: 97.3 F

## 2025-06-03 LAB
ATRIAL RATE: 78 BPM
HOLD SPECIMEN: NORMAL
P AXIS: 86 DEGREES
P OFFSET: 181 MS
P ONSET: 134 MS
PR INTERVAL: 150 MS
Q ONSET: 209 MS
QRS COUNT: 13 BEATS
QRS DURATION: 122 MS
QT INTERVAL: 386 MS
QTC CALCULATION(BAZETT): 440 MS
QTC FREDERICIA: 421 MS
R AXIS: -53 DEGREES
T AXIS: 81 DEGREES
T OFFSET: 402 MS
VENTRICULAR RATE: 78 BPM

## 2025-06-11 LAB
ATRIAL RATE: 80 BPM
P AXIS: 82 DEGREES
P OFFSET: 185 MS
P ONSET: 133 MS
PR INTERVAL: 152 MS
Q ONSET: 209 MS
QRS COUNT: 13 BEATS
QRS DURATION: 118 MS
QT INTERVAL: 380 MS
QTC CALCULATION(BAZETT): 438 MS
QTC FREDERICIA: 418 MS
R AXIS: -60 DEGREES
T AXIS: 85 DEGREES
T OFFSET: 399 MS
VENTRICULAR RATE: 80 BPM

## 2025-06-14 LAB
ATRIAL RATE: 78 BPM
P AXIS: 86 DEGREES
P OFFSET: 181 MS
P ONSET: 134 MS
PR INTERVAL: 150 MS
Q ONSET: 209 MS
QRS COUNT: 13 BEATS
QRS DURATION: 122 MS
QT INTERVAL: 386 MS
QTC CALCULATION(BAZETT): 440 MS
QTC FREDERICIA: 421 MS
R AXIS: -53 DEGREES
T AXIS: 81 DEGREES
T OFFSET: 402 MS
VENTRICULAR RATE: 78 BPM

## 2025-06-18 ENCOUNTER — NURSING HOME VISIT (OUTPATIENT)
Dept: POST ACUTE CARE | Facility: EXTERNAL LOCATION | Age: 70
End: 2025-06-18
Payer: COMMERCIAL

## 2025-06-18 DIAGNOSIS — F20.89 OTHER SCHIZOPHRENIA: ICD-10-CM

## 2025-06-18 DIAGNOSIS — G24.01 TARDIVE DYSKINESIA: ICD-10-CM

## 2025-06-18 DIAGNOSIS — G81.11 SPASTIC HEMIPLEGIA OF RIGHT DOMINANT SIDE DUE TO NONCEREBROVASCULAR ETIOLOGY (MULTI): ICD-10-CM

## 2025-06-18 DIAGNOSIS — I10 PRIMARY HYPERTENSION: Primary | ICD-10-CM

## 2025-06-18 DIAGNOSIS — G89.29 OTHER CHRONIC PAIN: ICD-10-CM

## 2025-06-18 PROCEDURE — 99309 SBSQ NF CARE MODERATE MDM 30: CPT | Performed by: STUDENT IN AN ORGANIZED HEALTH CARE EDUCATION/TRAINING PROGRAM

## 2025-06-19 ASSESSMENT — ENCOUNTER SYMPTOMS
PSYCHIATRIC NEGATIVE: 1
CARDIOVASCULAR NEGATIVE: 1
RESPIRATORY NEGATIVE: 1
MUSCULOSKELETAL NEGATIVE: 1
NEUROLOGICAL NEGATIVE: 1
GASTROINTESTINAL NEGATIVE: 1
CONSTITUTIONAL NEGATIVE: 1

## 2025-06-20 NOTE — PROGRESS NOTES
Subjective   Patient ID: Cristel Boyd is a 69 y.o. female.    Patient seen and examined on hospital follow-up.  Recently sent to the hospital due to worsening psychosis and subsequently was admitted for further management.  On evaluation, patient regards that she is overall doing well at the facility.  Facility and staff members note that patient's mentation and mood has overall been back at baseline.  Otherwise, no recent behaviors or issues.  Overall stable and doing well.         Review of Systems   Constitutional: Negative.    HENT: Negative.     Respiratory: Negative.     Cardiovascular: Negative.    Gastrointestinal: Negative.    Musculoskeletal: Negative.    Neurological: Negative.    Psychiatric/Behavioral: Negative.         Objective Vitals Reviewed via facility EMR   Physical Exam  Constitutional:       General: She is not in acute distress.     Appearance: She is not ill-appearing.      Comments: Pleasant, cooperative at baseline   Eyes:      Pupils: Pupils are equal, round, and reactive to light.   Cardiovascular:      Rate and Rhythm: Normal rate and regular rhythm.      Pulses: Normal pulses.      Heart sounds: No murmur heard.  Pulmonary:      Effort: No respiratory distress.      Breath sounds: No wheezing.   Abdominal:      General: Abdomen is flat. Bowel sounds are normal. There is no distension.   Musculoskeletal:      Right lower leg: No edema.      Left lower leg: No edema.   Skin:     General: Skin is warm and dry.   Neurological:      Mental Status: She is alert. Mental status is at baseline.      Cranial Nerves: No cranial nerve deficit.      Motor: Weakness present.   Psychiatric:         Mood and Affect: Mood normal.         Behavior: Behavior normal.         Assessment/Plan   Diagnoses and all orders for this visit:  Primary hypertension  Other schizophrenia  Other chronic pain  Tardive dyskinesia  Spastic hemiplegia of right dominant side due to noncerebrovascular etiology  (Multi)    Patient seen and examined at bedside.  Overall medically stable, medically optimized from a psychiatric standpoint.  Medically stable at this time, medications reviewed and reconciled, appreciate psych recommendations moving forward.  Continue supportive care and optimization with activities of daily living.  No additional issues or concerns at this time.     Reviewed and approved by AUSTIN MURRAY on 6/19/25 at 8:35 PM.

## 2025-07-03 ENCOUNTER — NURSING HOME VISIT (OUTPATIENT)
Dept: POST ACUTE CARE | Facility: EXTERNAL LOCATION | Age: 70
End: 2025-07-03
Payer: COMMERCIAL

## 2025-07-03 DIAGNOSIS — G81.11 SPASTIC HEMIPLEGIA OF RIGHT DOMINANT SIDE DUE TO NONCEREBROVASCULAR ETIOLOGY (MULTI): ICD-10-CM

## 2025-07-03 DIAGNOSIS — F20.89 OTHER SCHIZOPHRENIA: ICD-10-CM

## 2025-07-03 DIAGNOSIS — I10 PRIMARY HYPERTENSION: Primary | ICD-10-CM

## 2025-07-03 DIAGNOSIS — G47.09 OTHER INSOMNIA: ICD-10-CM

## 2025-07-03 DIAGNOSIS — R45.1 AGITATION: ICD-10-CM

## 2025-07-04 PROBLEM — R45.1 AGITATION: Status: ACTIVE | Noted: 2025-07-04

## 2025-07-04 PROBLEM — G47.09 OTHER INSOMNIA: Status: ACTIVE | Noted: 2025-07-04

## 2025-07-04 ASSESSMENT — ENCOUNTER SYMPTOMS
AGITATION: 1
MUSCULOSKELETAL NEGATIVE: 1
RESPIRATORY NEGATIVE: 1
GASTROINTESTINAL NEGATIVE: 1
NERVOUS/ANXIOUS: 1
HYPERACTIVE: 1
FATIGUE: 1
WEAKNESS: 1
CARDIOVASCULAR NEGATIVE: 1

## 2025-07-05 NOTE — PROGRESS NOTES
Subjective   Patient ID: Cristel Boyd is a 69 y.o. female.    Patient seen and examined on routine evaluation.  Overnight, received page from staffing that patient was having difficulty with sleeping, was very tearful, and agitated in the facility.  On evaluation, patient endorses that she is overall doing well, but feels like she cannot get to sleep.  Trazodone was ordered last night, but she endorses that she was not given this.  Otherwise, states no additional issues, staff regards that she has been more agitated as of late.  And is more tearful.         Review of Systems   Constitutional:  Positive for fatigue.   HENT: Negative.     Respiratory: Negative.     Cardiovascular: Negative.    Gastrointestinal: Negative.    Musculoskeletal: Negative.    Neurological:  Positive for weakness.   Psychiatric/Behavioral:  Positive for agitation and behavioral problems. The patient is nervous/anxious and is hyperactive.        Objective Vitals Reviewed via facility EMR   Physical Exam  Constitutional:       General: She is not in acute distress.     Appearance: She is not ill-appearing.   HENT:      Mouth/Throat:      Mouth: Mucous membranes are moist.      Pharynx: Oropharynx is clear. No oropharyngeal exudate or posterior oropharyngeal erythema.   Eyes:      Pupils: Pupils are equal, round, and reactive to light.   Cardiovascular:      Rate and Rhythm: Normal rate and regular rhythm.      Heart sounds: No murmur heard.  Pulmonary:      Effort: Pulmonary effort is normal. No respiratory distress.      Breath sounds: No wheezing.   Abdominal:      General: Abdomen is flat. Bowel sounds are normal. There is no distension.      Palpations: Abdomen is soft.      Tenderness: There is no abdominal tenderness.   Musculoskeletal:         General: No tenderness. Normal range of motion.   Skin:     General: Skin is warm and dry.   Neurological:      General: No focal deficit present.      Mental Status: She is alert and oriented  to person, place, and time. Mental status is at baseline.   Psychiatric:         Mood and Affect: Mood normal.      Comments: Agititated, anxiosu         Assessment/Plan   Diagnoses and all orders for this visit:  Primary hypertension  Other schizophrenia  Spastic hemiplegia of right dominant side due to noncerebrovascular etiology (Multi)  Agitation  Other insomnia  Patient seen examined at bedside.  Overall medically stable, I do suspect that patient's symptoms are underlying psych issues rather than medical issues.  Discussed with staff to consult psychiatry to have the patient evaluated.  Will also check CBC CMP TSH and UA to evaluate for metabolic processes.  Otherwise, we will start trazodone as needed for insomnia.  Continue supportive care.  Discussed care plan with staff.     Reviewed and approved by AUSTIN MURRAY on 7/4/25 at 8:40 PM.